# Patient Record
Sex: FEMALE | Race: WHITE | NOT HISPANIC OR LATINO | Employment: OTHER | URBAN - METROPOLITAN AREA
[De-identification: names, ages, dates, MRNs, and addresses within clinical notes are randomized per-mention and may not be internally consistent; named-entity substitution may affect disease eponyms.]

---

## 2018-07-17 ENCOUNTER — TELEPHONE (OUTPATIENT)
Dept: MEDICAL GROUP | Facility: PHYSICIAN GROUP | Age: 73
End: 2018-07-17

## 2018-07-17 RX ORDER — DIPHENOXYLATE HYDROCHLORIDE AND ATROPINE SULFATE 2.5; .025 MG/1; MG/1
1 TABLET ORAL 4 TIMES DAILY PRN
COMMUNITY
End: 2019-03-15 | Stop reason: SDUPTHER

## 2018-07-17 RX ORDER — LEVOTHYROXINE SODIUM 0.07 MG/1
75 TABLET ORAL
COMMUNITY
End: 2018-07-20

## 2018-07-17 NOTE — TELEPHONE ENCOUNTER
Future Appointments       Provider Department Center    7/18/2018 10:00 AM Sally Owen M.D. [facility]        NEW PATIENT VISIT PRE-VISIT PLANNING    1.  EpicCare Patient is checked in Patient Demographics? YES    2.  Immunizations were updated in Epic using WebIZ?: No WebIZ record       •  Web Iz Recommendations: PREVNAR (PCV13)  and TDAP    3.  Is this appointment scheduled as a Hospital Follow-Up? No    4.  Patient is due for the following Health Maintenance Topics:   Health Maintenance Due   Topic Date Due   • IMM DTaP/Tdap/Td Vaccine (1 - Tdap) 07/29/1963   • PAP SMEAR  07/29/1965   • MAMMOGRAM  07/29/1984   • COLONOSCOPY  07/29/1994   • BONE DENSITY  07/29/2009   • IMM PNEUMOCOCCAL  (1 of 2 - PCV13) 07/29/2009       5.  Reviewed/Updated the following with patient:   •   Preferred Pharmacy? YES       •   Preferred Lab? YES       •   Preferred Communication? YES       •   Allergies? YES       •   Medications? YES. Was Abstract Encounter opened and chart updated? YES       •   Social History? YES. Was Abstract Encounter opened and chart updated? YES       •   Family History (document living status of immediate family members and if + hx of cancer, diabetes, hypertension, hyperlipidemia, heart attack, stroke) YES. Was Abstract Encounter opened and chart updated? YES    6.  Updated Care Team?       •   DME Company (gait device, O2, CPAP, etc.) YES       •   Other Specialists (eye doctor, derm, GYN, cardiology, endo, etc): YES    7.  MDX printed for Provider? NO     8.  Patient was informed to arrive 15 min prior to their   scheduled appointment and bring in their medication bottles.

## 2018-07-18 ENCOUNTER — OFFICE VISIT (OUTPATIENT)
Dept: MEDICAL GROUP | Facility: PHYSICIAN GROUP | Age: 73
End: 2018-07-18
Payer: MEDICARE

## 2018-07-18 ENCOUNTER — HOSPITAL ENCOUNTER (OUTPATIENT)
Dept: LAB | Facility: MEDICAL CENTER | Age: 73
End: 2018-07-18
Attending: INTERNAL MEDICINE
Payer: MEDICARE

## 2018-07-18 VITALS
DIASTOLIC BLOOD PRESSURE: 84 MMHG | OXYGEN SATURATION: 94 % | WEIGHT: 139 LBS | TEMPERATURE: 97.5 F | SYSTOLIC BLOOD PRESSURE: 130 MMHG | HEIGHT: 65 IN | HEART RATE: 67 BPM | BODY MASS INDEX: 23.16 KG/M2

## 2018-07-18 DIAGNOSIS — Z12.31 ENCOUNTER FOR SCREENING MAMMOGRAM FOR BREAST CANCER: ICD-10-CM

## 2018-07-18 DIAGNOSIS — E78.5 DYSLIPIDEMIA: ICD-10-CM

## 2018-07-18 DIAGNOSIS — Z78.0 ASYMPTOMATIC MENOPAUSAL STATE: ICD-10-CM

## 2018-07-18 DIAGNOSIS — Z13.228 ENCOUNTER FOR SCREENING FOR METABOLIC DISORDER: ICD-10-CM

## 2018-07-18 DIAGNOSIS — E03.9 HYPOTHYROIDISM, UNSPECIFIED TYPE: ICD-10-CM

## 2018-07-18 DIAGNOSIS — G47.00 INSOMNIA, UNSPECIFIED TYPE: ICD-10-CM

## 2018-07-18 DIAGNOSIS — Z12.11 SCREENING FOR COLORECTAL CANCER: ICD-10-CM

## 2018-07-18 DIAGNOSIS — Z12.12 SCREENING FOR COLORECTAL CANCER: ICD-10-CM

## 2018-07-18 DIAGNOSIS — Z00.00 HEALTHCARE MAINTENANCE: ICD-10-CM

## 2018-07-18 DIAGNOSIS — Z23 NEED FOR VACCINATION: ICD-10-CM

## 2018-07-18 DIAGNOSIS — G25.0 BENIGN ESSENTIAL TREMOR: ICD-10-CM

## 2018-07-18 LAB
25(OH)D3 SERPL-MCNC: 19 NG/ML (ref 30–100)
ALBUMIN SERPL BCP-MCNC: 4.6 G/DL (ref 3.2–4.9)
ALBUMIN/GLOB SERPL: 1.6 G/DL
ALP SERPL-CCNC: 83 U/L (ref 30–99)
ALT SERPL-CCNC: 22 U/L (ref 2–50)
ANION GAP SERPL CALC-SCNC: 10 MMOL/L (ref 0–11.9)
AST SERPL-CCNC: 26 U/L (ref 12–45)
BASOPHILS # BLD AUTO: 0.9 % (ref 0–1.8)
BASOPHILS # BLD: 0.08 K/UL (ref 0–0.12)
BILIRUB SERPL-MCNC: 0.7 MG/DL (ref 0.1–1.5)
BUN SERPL-MCNC: 15 MG/DL (ref 8–22)
CALCIUM SERPL-MCNC: 9.6 MG/DL (ref 8.5–10.5)
CHLORIDE SERPL-SCNC: 101 MMOL/L (ref 96–112)
CHOLEST SERPL-MCNC: 296 MG/DL (ref 100–199)
CO2 SERPL-SCNC: 28 MMOL/L (ref 20–33)
CREAT SERPL-MCNC: 0.88 MG/DL (ref 0.5–1.4)
EOSINOPHIL # BLD AUTO: 0.13 K/UL (ref 0–0.51)
EOSINOPHIL NFR BLD: 1.5 % (ref 0–6.9)
ERYTHROCYTE [DISTWIDTH] IN BLOOD BY AUTOMATED COUNT: 49.1 FL (ref 35.9–50)
EST. AVERAGE GLUCOSE BLD GHB EST-MCNC: 114 MG/DL
GLOBULIN SER CALC-MCNC: 2.8 G/DL (ref 1.9–3.5)
GLUCOSE SERPL-MCNC: 87 MG/DL (ref 65–99)
HBA1C MFR BLD: 5.6 % (ref 0–5.6)
HCT VFR BLD AUTO: 41.2 % (ref 37–47)
HDLC SERPL-MCNC: 62 MG/DL
HGB BLD-MCNC: 13.7 G/DL (ref 12–16)
IMM GRANULOCYTES # BLD AUTO: 0.04 K/UL (ref 0–0.11)
IMM GRANULOCYTES NFR BLD AUTO: 0.5 % (ref 0–0.9)
LDLC SERPL CALC-MCNC: 198 MG/DL
LYMPHOCYTES # BLD AUTO: 2.18 K/UL (ref 1–4.8)
LYMPHOCYTES NFR BLD: 25.3 % (ref 22–41)
MCH RBC QN AUTO: 31.6 PG (ref 27–33)
MCHC RBC AUTO-ENTMCNC: 33.3 G/DL (ref 33.6–35)
MCV RBC AUTO: 94.9 FL (ref 81.4–97.8)
MONOCYTES # BLD AUTO: 0.64 K/UL (ref 0–0.85)
MONOCYTES NFR BLD AUTO: 7.4 % (ref 0–13.4)
NEUTROPHILS # BLD AUTO: 5.53 K/UL (ref 2–7.15)
NEUTROPHILS NFR BLD: 64.4 % (ref 44–72)
NRBC # BLD AUTO: 0 K/UL
NRBC BLD-RTO: 0 /100 WBC
PLATELET # BLD AUTO: 260 K/UL (ref 164–446)
PMV BLD AUTO: 11.9 FL (ref 9–12.9)
POTASSIUM SERPL-SCNC: 4.2 MMOL/L (ref 3.6–5.5)
PROT SERPL-MCNC: 7.4 G/DL (ref 6–8.2)
RBC # BLD AUTO: 4.34 M/UL (ref 4.2–5.4)
SODIUM SERPL-SCNC: 139 MMOL/L (ref 135–145)
T4 FREE SERPL-MCNC: 0.56 NG/DL (ref 0.53–1.43)
TRIGL SERPL-MCNC: 180 MG/DL (ref 0–149)
TSH SERPL DL<=0.005 MIU/L-ACNC: 47.48 UIU/ML (ref 0.38–5.33)
WBC # BLD AUTO: 8.6 K/UL (ref 4.8–10.8)

## 2018-07-18 PROCEDURE — 80053 COMPREHEN METABOLIC PANEL: CPT

## 2018-07-18 PROCEDURE — 36415 COLL VENOUS BLD VENIPUNCTURE: CPT

## 2018-07-18 PROCEDURE — 82306 VITAMIN D 25 HYDROXY: CPT | Mod: GA

## 2018-07-18 PROCEDURE — 85025 COMPLETE CBC W/AUTO DIFF WBC: CPT

## 2018-07-18 PROCEDURE — 84439 ASSAY OF FREE THYROXINE: CPT

## 2018-07-18 PROCEDURE — 99204 OFFICE O/P NEW MOD 45 MIN: CPT | Performed by: INTERNAL MEDICINE

## 2018-07-18 PROCEDURE — 80061 LIPID PANEL: CPT

## 2018-07-18 PROCEDURE — 84443 ASSAY THYROID STIM HORMONE: CPT

## 2018-07-18 PROCEDURE — 83036 HEMOGLOBIN GLYCOSYLATED A1C: CPT | Mod: GA

## 2018-07-18 RX ORDER — PRAVASTATIN SODIUM 20 MG
20 TABLET ORAL NIGHTLY
COMMUNITY
End: 2018-07-20

## 2018-07-18 RX ORDER — TRAZODONE HYDROCHLORIDE 50 MG/1
50 TABLET ORAL NIGHTLY
COMMUNITY
End: 2019-03-15 | Stop reason: SDUPTHER

## 2018-07-18 ASSESSMENT — PATIENT HEALTH QUESTIONNAIRE - PHQ9: CLINICAL INTERPRETATION OF PHQ2 SCORE: 0

## 2018-07-18 NOTE — ASSESSMENT & PLAN NOTE
Due for mammogram, colonoscopy, pneumonia and tetanus immunizations. Last DEXA scan done within the last 2 years as per the patient will get the records. Did not have colonoscopy in the past preferring fit test.

## 2018-07-18 NOTE — ASSESSMENT & PLAN NOTE
This is a chronic health problem that is well controlled with current medications and lifestyle measures. Was supposed to be on pravastatin 20 mg daily at bedtime, but not taking for the past 2 years.Last blood work at previous PCP within the last 2 yrs checked in May 2016.

## 2018-07-18 NOTE — ASSESSMENT & PLAN NOTE
This is a new problem which patient has been experiencing for the past 2 years.Happens intermittently. Refusing any medications offered.

## 2018-07-18 NOTE — ASSESSMENT & PLAN NOTE
This is a chronic health problem that is well controlled with current medications and lifestyle measures. Last blood work in 2016 . Currently on levothyroxine 75 µg every morning.

## 2018-07-18 NOTE — LETTER
[facility]  Sally Owen M.D.  [address]  [place] [address]  Fax: [phone]   Authorization for Release/Disclosure of   Protected Health Information   Name: POPPY PUENTE : 1945 SSN: [id number]   Address: [address] [place] [address]  [place] [address] Phone:    [phone] (home)    I authorize the entity listed below to release/disclose the PHI below to:   [facility]/Sally Owen M.D. and Sally Owen M.D.   Provider or Entity Name: Shane Celaya MD      Address   City, [facility], [facility]   Phone:      Fax:[phone]     Reason for request: continuity of care   Information to be released:    [  ] LAST COLONOSCOPY,  including any PATH REPORT and follow-up  [  ] LAST FIT/COLOGUARD RESULT [  ] LAST DEXA  [  ] LAST MAMMOGRAM  [  ] LAST PAP  [  ] LAST LABS [ ] RETINA EXAM REPORT  [  ] IMMUNIZATION RECORDS  [ XXX ] Release all info      [  ] Check here and initial the line next to each item to release ALL health information INCLUDING  _____ Care and treatment for drug and / or alcohol abuse  _____ HIV testing, infection status, or AIDS  _____ Genetic Testing    DATES OF SERVICE OR TIME PERIOD TO BE DISCLOSED: _____________  I understand and acknowledge that:  * This Authorization may be revoked at any time by you in writing, except if your health information has already been used or disclosed.  * Your health information that will be used or disclosed as a result of you signing this authorization could be re-disclosed by the recipient. If this occurs, your re-disclosed health information may no longer be protected by State or Federal laws.  * You may refuse to sign this Authorization. Your refusal will not affect your ability to obtain treatment.  * This Authorization becomes effective upon signing and will  on (date) __________.      If no date is indicated, this Authorization will  one (1) year from the signature date.    Name: Poppy Puente    Signature:   Date:        7/18/2018       PLEASE FAX REQUESTED RECORDS BACK TO: [phone]

## 2018-07-18 NOTE — ASSESSMENT & PLAN NOTE
This is a chronic health problem that is well controlled with current medications and lifestyle measures. Previously on trazodone 50 mg daily at bedtime currently only on when necessary.

## 2018-07-18 NOTE — LETTER
Request for Medical Records    Patient Name: Poppy Puente    : 1945      Dear Doctor: Janel Carlson O.D.    The above named patient receives primary care at the [facility] by Sally Owen M.D..  The patient informs us that you are her eye care Provider.    Please fax a copy of the most recent eye exam to [phone] or answer the  questions below and fax this sheet back to us at the above number.  Attached is a signed Release of Information.      Date of last eye exam: _____________    Retinal eye exam summary:        Please select the choice(s) that apply.    ____ No diabetic retinopathy    ____    Diabetic retinopathy present      Printed Name and Credentials: __________________________________    Signature of Eye Care Provider: _________________________________    We appreciate your assistance and collaboration in providing efficient patient care!    Kindest Regards,    [facility]  [address]  [facility] [address] [phone]

## 2018-07-18 NOTE — PROGRESS NOTES
CC:  Establish care with new PCP, hypothyroidism.    HISTORY OF THE PRESENT ILLNESS: Patient is a 72 y.o. female. This pleasant patient is here today to establish care with new PCP and discuss her medical conditions as mentioned in history of present illness below.    Health Maintenance: Completed      Dyslipidemia  This is a chronic health problem that is well controlled with current medications and lifestyle measures. Was supposed to be on pravastatin 20 mg daily at bedtime, but not taking for the past 2 years.Last blood work at previous PCP within the last 2 yrs checked in May 2016.     Insomnia  This is a chronic health problem that is well controlled with current medications and lifestyle measures. Previously on trazodone 50 mg daily at bedtime currently only on when necessary.    Hypothyroidism  This is a chronic health problem that is well controlled with current medications and lifestyle measures. Last blood work in 2016 . Currently on levothyroxine 75 µg every morning.    Healthcare maintenance  Due for mammogram, colonoscopy, pneumonia and tetanus immunizations. Last DEXA scan done within the last 2 years as per the patient will get the records. Did not have colonoscopy in the past preferring fit test.    Benign essential tremor  This is a new problem which patient has been experiencing for the past 2 years.Happens intermittently. Refusing any medications offered.    PHQ score 0, BMI within normal limits, no tobacco, no fall injuries    Allergies: Patient has no known allergies.    Current Outpatient Prescriptions Ordered in [facility]   Medication Sig Dispense Refill   • NON SPECIFIED Please give Tdap and PCV 13. 1 Each 0   • traZODone (DESYREL) 50 MG Tab Take 50 mg by mouth every evening.     • pravastatin (PRAVACHOL) 20 MG Tab Take 20 mg by mouth every evening.     • levothyroxine (SYNTHROID) 75 MCG Tab Take 75 mcg by mouth Every morning on an empty stomach.     • diphenoxylate-atropine (LOMOTIL) 2.5-0.025 MG  Tab Take 1 Tab by mouth 4 times a day as needed for Diarrhea.       No current Epic-ordered facility-administered medications on file.        History reviewed. No pertinent past medical history.    History reviewed. No pertinent surgical history.    Social History   Substance Use Topics   • Smoking status: Never Smoker   • Smokeless tobacco: Never Used   • Alcohol use Yes      Comment: occ        Social History     Social History Narrative   • No narrative on file       Family History   Problem Relation Age of Onset   • No Known Problems Mother    • No Known Problems Father    • Arthritis Sister    • No Known Problems Maternal Grandmother    • No Known Problems Maternal Grandfather    • No Known Problems Sister        ROS:     - Constitutional: Negative for fever, chills, unexpected weight change, and fatigue/generalized weakness.     - HEENT: Negative for headaches, vision changes, hearing changes, ear pain, ear discharge, rhinorrhea, sinus congestion, sore throat, and neck pain.      - Respiratory: Negative for cough, sputum production, chest congestion, dyspnea, wheezing, and crackles.      - Cardiovascular: Negative for chest pain, palpitations, orthopnea, and bilateral lower extremity edema.     - Gastrointestinal: Negative for heartburn, nausea, vomiting, abdominal pain, hematochezia, melena, diarrhea, constipation, and greasy/foul-smelling stools.     - Genitourinary: Negative for dysuria, polyuria, hematuria, pyuria, urinary urgency, and urinary incontinence.     - Musculoskeletal: Negative for myalgias, back pain, and joint pain.     - Skin: Negative for rash, itching, cyanotic skin color change.     - Neurological: Positive for occasional tremors which is intentional tremor on doing some fine work as per the history Negative for dizziness, tingling, focal sensory deficit, focal weakness and headaches.     - Endo/Heme/Allergies: Does not bruise/bleed easily.     - Psychiatric/Behavioral: Negative for  "depression, suicidal/homicidal ideation and memory loss.      Last lab work not seen in [facility], patient says it was done probably in 2016. We will get the records from previous PCP.    Exam: Blood pressure 130/84, pulse 67, temperature 36.4 °C (97.5 °F), height 1.651 m (5' 5\"), weight 63 kg (139 lb), SpO2 94 %. Body mass index is 23.13 kg/m².    General: Normal appearing. No distress.  HEENT: Normocephalic. Eyes conjunctiva clear lids without ptosis, pupils equal and reactive to light accommodation, ears normal shape and contour, canals are clear bilaterally, tympanic membranes are benign, nasal mucosa benign, oropharynx is without erythema, edema or exudates.   Neck: Supple without JVD or bruit. Thyroid is not enlarged.  Pulmonary: Clear to ausculation.  Normal effort. No rales, ronchi, or wheezing.  Cardiovascular: Regular rate and rhythm without murmur. Carotid and radial pulses are intact and equal bilaterally.  Abdomen: Soft, nontender, nondistended. Normal bowel sounds. Liver and spleen are not palpable  Neurologic: Grossly nonfocal. No tremors on examining with outstretched hands. Patient says it is not happening today and only happens intermittently.  Lymph: No cervical, supraclavicular or axillary lymph nodes are palpable  Skin: Warm and dry.  No obvious lesions.  Musculoskeletal: Normal gait. No extremity cyanosis, clubbing, or edema.  Psych: Normal mood and affect. Alert and oriented x3. Judgment and insight is normal.    Please note that this dictation was created using voice recognition software. I have made every reasonable attempt to correct obvious errors, but I expect that there are errors of grammar and possibly content that I did not discover before finalizing the note.      Assessment/Plan  1. Dyslipidemia  Stable, supposed to be on medication pravastatin 20 mg daily at bedtime which patient stopped by herself 2 years back as she didn't have refills. Did not have a checkup since 2016, we will do a " lipid panel and CMP and restart this medication.  - COMP METABOLIC PANEL; Future  - LIPID PROFILE; Future    2. Hypothyroidism, unspecified type  Stable, currently on levothyroxine 75 mg every morning. No lab work done after 2016 as per the patient, I do not have the records. Requesting for refills. Given instructions that I will have to see the lab work results before refilling the medication.  - TSH WITH REFLEX TO FT4; Future    3. Insomnia, unspecified type  Well controlled, continue current trazodone 50 mg daily at bedtime when necessary.    4. Screening for colorectal cancer  - OCCULT BLOOD FECES IMMUNOASSAY (FIT); Future    5. Asymptomatic menopausal state  Never got her vitamin D checked any time, we will do a vitamin D checked and repleted if needed.  - VITAMIN D,25 HYDROXY; Future    6. Encounter for screening mammogram for breast cancer  - MA-SCREEN MAMMO W/CAD-BILAT; Future    7. Need for vaccination  - NON SPECIFIED; Please give Tdap and PCV 13.  Dispense: 1 Each; Refill: 0    8. Encounter for screening for metabolic disorder  Will do a baseline CBC, A1c given her age.  - CBC WITH DIFFERENTIAL; Future  - HEMOGLOBIN A1C; Future    10. Benign essential tremor  Patient does not have the tremor today on my exam, it happens only intermittently as per history. Not ready to start on any medication at this time given instructions to inform if it aggravates which patient understood and agreed.

## 2018-07-19 ENCOUNTER — TELEPHONE (OUTPATIENT)
Dept: MEDICAL GROUP | Facility: PHYSICIAN GROUP | Age: 73
End: 2018-07-19

## 2018-07-19 NOTE — TELEPHONE ENCOUNTER
----- Message from Sally Owen M.D. sent at 7/18/2018  8:52 PM PDT -----  Your CBC is normal.  Sally Owen M.D.

## 2018-07-20 ENCOUNTER — TELEPHONE (OUTPATIENT)
Dept: MEDICAL GROUP | Facility: PHYSICIAN GROUP | Age: 73
End: 2018-07-20

## 2018-07-20 DIAGNOSIS — E78.5 DYSLIPIDEMIA: ICD-10-CM

## 2018-07-20 DIAGNOSIS — E55.9 VITAMIN D DEFICIENCY: ICD-10-CM

## 2018-07-20 DIAGNOSIS — E03.9 HYPOTHYROIDISM, UNSPECIFIED TYPE: ICD-10-CM

## 2018-07-20 RX ORDER — ATORVASTATIN CALCIUM 20 MG/1
20 TABLET, FILM COATED ORAL DAILY
Qty: 90 TAB | Refills: 1 | Status: SHIPPED | OUTPATIENT
Start: 2018-07-20 | End: 2018-07-20

## 2018-07-20 RX ORDER — PRAVASTATIN SODIUM 20 MG
40 TABLET ORAL DAILY
Qty: 90 TAB | Refills: 1 | Status: SHIPPED | OUTPATIENT
Start: 2018-07-20 | End: 2019-09-24

## 2018-07-20 RX ORDER — LEVOTHYROXINE SODIUM 0.1 MG/1
100 TABLET ORAL
Qty: 30 TAB | Refills: 1 | Status: SHIPPED | OUTPATIENT
Start: 2018-07-20 | End: 2018-08-31

## 2018-07-20 RX ORDER — ERGOCALCIFEROL 1.25 MG/1
50000 CAPSULE ORAL
Qty: 12 CAP | Refills: 3 | Status: SHIPPED | OUTPATIENT
Start: 2018-07-20 | End: 2019-09-24

## 2018-07-20 NOTE — TELEPHONE ENCOUNTER
Patient notified via Phone. Patient was prescribed atorvastatin but stated she has side effects when taking. Patient prefers pravastatin which does not have side effects for her.

## 2018-07-20 NOTE — TELEPHONE ENCOUNTER
----- Message from Sally Owen M.D. sent at 7/20/2018 12:20 AM PDT -----  Your Cholesterol levels are abnormally high , I have changed the medication to atorvastatin 20 mg sent to your pharmacy. Discontinued the previous medication Pravastatin.  Your Vitamin D is low , sent high dose to your pharmacy.  Your thyroid levels are severely elevated at 47 . I have increased the dose of your Levothyroxine to 100 mcg sent to your pharmacy , will need a repeat TSH level in 6 weeks at 08/28/2018 , after starting the new dose so that I can adjust the dose further if needed.  All of your other labs were normal. You may see some that are highlighted, however these have no clinical significance.  Sally Owen M.D.

## 2018-07-20 NOTE — TELEPHONE ENCOUNTER
I have increased the dose of pravastatin to 40 mg daily given the abnormal lab results. Thank you  Sally Owen M.D.

## 2018-07-24 ENCOUNTER — HOSPITAL ENCOUNTER (OUTPATIENT)
Facility: MEDICAL CENTER | Age: 73
End: 2018-07-24
Attending: INTERNAL MEDICINE
Payer: MEDICARE

## 2018-07-24 PROCEDURE — 82274 ASSAY TEST FOR BLOOD FECAL: CPT

## 2018-07-29 DIAGNOSIS — Z12.11 SCREENING FOR COLORECTAL CANCER: ICD-10-CM

## 2018-07-29 DIAGNOSIS — Z12.12 SCREENING FOR COLORECTAL CANCER: ICD-10-CM

## 2018-07-30 LAB — HEMOCCULT STL QL IA: NEGATIVE

## 2018-08-09 ENCOUNTER — HOSPITAL ENCOUNTER (OUTPATIENT)
Dept: RADIOLOGY | Facility: MEDICAL CENTER | Age: 73
End: 2018-08-09
Attending: INTERNAL MEDICINE
Payer: MEDICARE

## 2018-08-09 DIAGNOSIS — Z12.31 ENCOUNTER FOR SCREENING MAMMOGRAM FOR BREAST CANCER: ICD-10-CM

## 2018-08-09 PROCEDURE — 77067 SCR MAMMO BI INCL CAD: CPT

## 2018-08-16 ENCOUNTER — HOSPITAL ENCOUNTER (OUTPATIENT)
Dept: RADIOLOGY | Facility: MEDICAL CENTER | Age: 73
End: 2018-08-16

## 2018-08-23 ENCOUNTER — TELEPHONE (OUTPATIENT)
Dept: MEDICAL GROUP | Facility: PHYSICIAN GROUP | Age: 73
End: 2018-08-23

## 2018-08-23 NOTE — PROGRESS NOTES
Your DEXA scan for bone density is normal, continue over-the-counter calcium and vitamin D supplements.  Sally Owen M.D.

## 2018-08-23 NOTE — TELEPHONE ENCOUNTER
----- Message from Sally Owen M.D. sent at 8/22/2018 10:56 PM PDT -----  - PLEASE IGNORE THE PREVIOUS DEXA RESULT MESSAGE WHICH WAS SENT IN ERROR.    I reviewed the results of your mammogram. Radiologist reports normal findings. I recommend recheck in one year.Thanks.  Sally Owen M.D.

## 2018-08-30 ENCOUNTER — HOSPITAL ENCOUNTER (OUTPATIENT)
Dept: LAB | Facility: MEDICAL CENTER | Age: 73
End: 2018-08-30
Attending: INTERNAL MEDICINE
Payer: MEDICARE

## 2018-08-30 DIAGNOSIS — E03.9 HYPOTHYROIDISM, UNSPECIFIED TYPE: ICD-10-CM

## 2018-08-30 LAB — TSH SERPL DL<=0.005 MIU/L-ACNC: 0.08 UIU/ML (ref 0.38–5.33)

## 2018-08-30 PROCEDURE — 36415 COLL VENOUS BLD VENIPUNCTURE: CPT

## 2018-08-30 PROCEDURE — 84443 ASSAY THYROID STIM HORMONE: CPT

## 2018-08-31 DIAGNOSIS — E03.9 HYPOTHYROIDISM, UNSPECIFIED TYPE: ICD-10-CM

## 2018-08-31 RX ORDER — LEVOTHYROXINE SODIUM 0.05 MG/1
50 TABLET ORAL
Qty: 90 TAB | Refills: 1 | Status: SHIPPED | OUTPATIENT
Start: 2018-08-31 | End: 2019-02-07 | Stop reason: SDUPTHER

## 2019-02-07 DIAGNOSIS — E03.9 HYPOTHYROIDISM, UNSPECIFIED TYPE: ICD-10-CM

## 2019-02-08 DIAGNOSIS — E03.9 HYPOTHYROIDISM, UNSPECIFIED TYPE: ICD-10-CM

## 2019-02-08 RX ORDER — LEVOTHYROXINE SODIUM 0.05 MG/1
TABLET ORAL
Qty: 30 TAB | Refills: 0 | Status: SHIPPED | OUTPATIENT
Start: 2019-02-08 | End: 2019-03-15 | Stop reason: SDUPTHER

## 2019-02-08 NOTE — TELEPHONE ENCOUNTER
Refill done for 30 days. Placed repeat thyroid test which needs to be done.  Please make a follow up appointment for further discussions.  Sally Owen M.D.

## 2019-03-08 ENCOUNTER — HOSPITAL ENCOUNTER (OUTPATIENT)
Dept: LAB | Facility: MEDICAL CENTER | Age: 74
End: 2019-03-08
Attending: INTERNAL MEDICINE
Payer: MEDICARE

## 2019-03-08 DIAGNOSIS — E03.9 HYPOTHYROIDISM, UNSPECIFIED TYPE: ICD-10-CM

## 2019-03-08 LAB — TSH SERPL DL<=0.005 MIU/L-ACNC: 5.02 UIU/ML (ref 0.38–5.33)

## 2019-03-08 PROCEDURE — 84443 ASSAY THYROID STIM HORMONE: CPT

## 2019-03-08 PROCEDURE — 36415 COLL VENOUS BLD VENIPUNCTURE: CPT

## 2019-03-15 ENCOUNTER — OFFICE VISIT (OUTPATIENT)
Dept: MEDICAL GROUP | Facility: PHYSICIAN GROUP | Age: 74
End: 2019-03-15
Payer: MEDICARE

## 2019-03-15 VITALS
HEART RATE: 77 BPM | TEMPERATURE: 97.8 F | SYSTOLIC BLOOD PRESSURE: 108 MMHG | BODY MASS INDEX: 24.56 KG/M2 | OXYGEN SATURATION: 96 % | DIASTOLIC BLOOD PRESSURE: 70 MMHG | HEIGHT: 65 IN | WEIGHT: 147.4 LBS

## 2019-03-15 DIAGNOSIS — K52.9 CHRONIC DIARRHEA: ICD-10-CM

## 2019-03-15 DIAGNOSIS — E78.5 DYSLIPIDEMIA: ICD-10-CM

## 2019-03-15 DIAGNOSIS — Z12.12 SCREENING FOR COLORECTAL CANCER: ICD-10-CM

## 2019-03-15 DIAGNOSIS — E03.9 HYPOTHYROIDISM, UNSPECIFIED TYPE: ICD-10-CM

## 2019-03-15 DIAGNOSIS — G47.00 INSOMNIA, UNSPECIFIED TYPE: ICD-10-CM

## 2019-03-15 DIAGNOSIS — Z12.11 SCREENING FOR COLORECTAL CANCER: ICD-10-CM

## 2019-03-15 PROCEDURE — 99214 OFFICE O/P EST MOD 30 MIN: CPT | Performed by: INTERNAL MEDICINE

## 2019-03-15 RX ORDER — DIPHENOXYLATE HYDROCHLORIDE AND ATROPINE SULFATE 2.5; .025 MG/1; MG/1
1 TABLET ORAL 4 TIMES DAILY PRN
Qty: 20 TAB | Refills: 1 | Status: SHIPPED
Start: 2019-03-15 | End: 2019-04-14

## 2019-03-15 RX ORDER — TRAZODONE HYDROCHLORIDE 50 MG/1
50 TABLET ORAL
Qty: 90 TAB | Refills: 1 | Status: SHIPPED | OUTPATIENT
Start: 2019-03-15 | End: 2019-09-24

## 2019-03-15 RX ORDER — LEVOTHYROXINE SODIUM 0.05 MG/1
50 TABLET ORAL EVERY MORNING
Qty: 90 TAB | Refills: 1 | Status: SHIPPED | OUTPATIENT
Start: 2019-03-15 | End: 2019-09-28 | Stop reason: SDUPTHER

## 2019-03-15 ASSESSMENT — PATIENT HEALTH QUESTIONNAIRE - PHQ9: CLINICAL INTERPRETATION OF PHQ2 SCORE: 0

## 2019-03-15 NOTE — ASSESSMENT & PLAN NOTE
This is a chronic health problem that is well controlled with current medications and lifestyle measures. Stopped taking her Pravastatin in 07/2018 as pt doesn't want to take statins at this age.

## 2019-03-15 NOTE — PROGRESS NOTES
CC: Follow-up visit, diarrhea.    HISTORY OF PRESENT ILLNESS: Patient is a 73 y.o. female established patient who presents today to discuss her medical conditions as mentioned in HPI below.    Health Maintenance: Due for all the healthcare maintenance which she refuses only accepted for a fit test.    Dyslipidemia  This is a chronic health problem that is well controlled with current medications and lifestyle measures. Stopped taking her Pravastatin in 07/2018 as pt doesn't want to take statins at this age.    Hypothyroidism  This is a chronic health problem that is well controlled with current medications and lifestyle measures. Last TSH was normal in 03/2019. On LT 50 mcg QAM    Chronic diarrhea  This is a chronic health problem that is well controlled with current medications and lifestyle measures. Used to get Lomotil with her previous provider at [place]. Requesting for the same as she needs once a week.      PHQ score 0, BMI within normal limits, no tobacco, no fall injuries.    Patient Active Problem List    Diagnosis Date Noted   • Chronic diarrhea 03/15/2019   • Dyslipidemia 07/18/2018   • Hypothyroidism 07/18/2018   • Insomnia 07/18/2018   • Healthcare maintenance 07/18/2018   • Benign essential tremor 07/18/2018      Allergies:Patient has no known allergies.    Current Outpatient Prescriptions   Medication Sig Dispense Refill   • traZODone (DESYREL) 50 MG Tab Take 1 Tab by mouth at bedtime as needed for Sleep. 90 Tab 1   • levothyroxine (SYNTHROID) 50 MCG Tab Take 1 Tab by mouth every morning. ON A EMPTY STOMACH 90 Tab 1   • diphenoxylate-atropine (LOMOTIL) 2.5-0.025 MG Tab Take 1 Tab by mouth 4 times a day as needed for Diarrhea for up to 30 days. 20 Tab 1   • vitamin D, Ergocalciferol, (DRISDOL) 16632 units Cap capsule Take 1 Cap by mouth every 7 days. 12 Cap 3   • pravastatin (PRAVACHOL) 20 MG Tab Take 2 Tabs by mouth every day. (Patient not taking: Reported on 3/15/2019) 90 Tab 1   • NON SPECIFIED  Please give Tdap and PCV 13. 1 Each 0     No current facility-administered medications for this visit.        Social History   Substance Use Topics   • Smoking status: Never Smoker   • Smokeless tobacco: Never Used   • Alcohol use Yes      Comment: occ      Social History     Social History Narrative   • No narrative on file       Family History   Problem Relation Age of Onset   • No Known Problems Mother    • No Known Problems Father    • Arthritis Sister    • No Known Problems Maternal Grandmother    • No Known Problems Maternal Grandfather    • No Known Problems Sister         ROS:     - Constitutional:  Negative for fever, chills, unexpected weight change, and fatigue/generalized weakness.    - HEENT:  Negative for headaches, vision changes, hearing changes, ear pain, ear discharge, rhinorrhea, sinus congestion, sore throat, and neck pain.      - Respiratory: Negative for cough, sputum production, chest congestion, dyspnea, wheezing, and crackles.      - Cardiovascular: Negative for chest pain, palpitations, orthopnea, and bilateral lower extremity edema.     - Gastrointestinal: As mentioned in HPI above negative for heartburn, nausea, vomiting, abdominal pain, hematochezia, melena, diarrhea, constipation, and greasy/foul-smelling stools.     - Genitourinary: Negative for dysuria, polyuria, hematuria, pyuria, urinary urgency, and urinary incontinence.     - Musculoskeletal: Negative for myalgias, back pain, and joint pain.     - Skin: Negative for rash, itching, cyanotic skin color change.     - Neurological: Negative for dizziness, tingling, tremors, focal sensory deficit, focal weakness and headaches.     - Endo/Heme/Allergies: Does not bruise/bleed easily.     - Psychiatric/Behavioral: Negative for depression, suicidal/homicidal ideation and memory loss.        Last lab work in July 2018 reviewed and discussed with patient.    Exam:    Blood pressure 108/70, pulse 77, temperature 36.6 °C (97.8 °F),  "temperature source Temporal, height 1.651 m (5' 5\"), weight 66.9 kg (147 lb 6.4 oz), SpO2 96 %. Body mass index is 24.53 kg/m².    General:  Well nourished, well developed female in NAD  Head is grossly normal.  Neck: Supple without JVD or bruit. Thyroid is not enlarged.  Pulmonary: Clear to ausculation and percussion.  Normal effort. No rales, ronchi, or wheezing.  Cardiovascular: Regular rate and rhythm without murmur. Carotid and radial pulses are intact and equal bilaterally.  Extremities: no clubbing, cyanosis, or edema.  Abdominal exam : Soft, nontender, no rigidity or guarding.    Please note that this dictation was created using voice recognition software. I have made every reasonable attempt to correct obvious errors, but I expect that there are errors of grammar and possibly content that I did not discover before finalizing the note.    Assessment/Plan:  1. Hypothyroidism, unspecified type  Well-controlled, continue current levothyroxine 50 mg nightly.  Recent TSH levels were abnormal for which the dosage was adjusted and last workup in March 2019 was within normal limits.  - levothyroxine (SYNTHROID) 50 MCG Tab; Take 1 Tab by mouth every morning. ON A EMPTY STOMACH  Dispense: 90 Tab; Refill: 1    2. Dyslipidemia  Last lipid panel in July 2018 abnormal with elevated triglycerides and LDL.  Patient self managed and stopped the pravastatin as his insurance is not covering anymore and will need an alternative statin.  This was discussed but she is refusing to take any at this time, all the risks understood.    3. Insomnia, unspecified type  Well-controlled, continue current trazodone 50 mg daily.  - traZODone (DESYREL) 50 MG Tab; Take 1 Tab by mouth at bedtime as needed for Sleep.  Dispense: 90 Tab; Refill: 1    5. Chronic diarrhea  Ongoing problem, patient requesting only for Lomotil refusing all the alternatives offered to her.  I have provided her the handout of adverse reactions of atropine in this age " group which he understood all the risks but insisting only this medication.  She takes only around 1 week will give short refills.  - diphenoxylate-atropine (LOMOTIL) 2.5-0.025 MG Tab; Take 1 Tab by mouth 4 times a day as needed for Diarrhea for up to 30 days.  Dispense: 20 Tab; Refill: 1    6. Screening for colorectal cancer  - OCCULT BLOOD FECES IMMUNOASSAY (FIT); Future

## 2019-03-15 NOTE — ASSESSMENT & PLAN NOTE
This is a chronic health problem that is well controlled with current medications and lifestyle measures. Used to get Lomotil with her previous provider at [place]. Requesting for the same as she needs once a week.

## 2019-03-15 NOTE — ASSESSMENT & PLAN NOTE
This is a chronic health problem that is well controlled with current medications and lifestyle measures. Last TSH was normal in 03/2019. On LT 50 mcg QAM

## 2019-03-20 ENCOUNTER — HOSPITAL ENCOUNTER (OUTPATIENT)
Facility: MEDICAL CENTER | Age: 74
End: 2019-03-20
Attending: INTERNAL MEDICINE
Payer: MEDICARE

## 2019-03-20 PROCEDURE — 82274 ASSAY TEST FOR BLOOD FECAL: CPT

## 2019-03-22 DIAGNOSIS — Z12.12 SCREENING FOR COLORECTAL CANCER: ICD-10-CM

## 2019-03-22 DIAGNOSIS — Z12.11 SCREENING FOR COLORECTAL CANCER: ICD-10-CM

## 2019-03-23 LAB — HEMOCCULT STL QL IA: NEGATIVE

## 2019-09-24 ENCOUNTER — HOSPITAL ENCOUNTER (OUTPATIENT)
Dept: LAB | Facility: MEDICAL CENTER | Age: 74
End: 2019-09-24
Attending: INTERNAL MEDICINE
Payer: MEDICARE

## 2019-09-24 ENCOUNTER — OFFICE VISIT (OUTPATIENT)
Dept: MEDICAL GROUP | Facility: PHYSICIAN GROUP | Age: 74
End: 2019-09-24
Payer: MEDICARE

## 2019-09-24 VITALS
OXYGEN SATURATION: 97 % | HEART RATE: 60 BPM | HEIGHT: 65 IN | TEMPERATURE: 97.1 F | BODY MASS INDEX: 24.32 KG/M2 | DIASTOLIC BLOOD PRESSURE: 74 MMHG | WEIGHT: 146 LBS | SYSTOLIC BLOOD PRESSURE: 112 MMHG

## 2019-09-24 DIAGNOSIS — E03.9 HYPOTHYROIDISM, UNSPECIFIED TYPE: ICD-10-CM

## 2019-09-24 DIAGNOSIS — Z23 NEED FOR VACCINATION: ICD-10-CM

## 2019-09-24 DIAGNOSIS — E78.5 DYSLIPIDEMIA: ICD-10-CM

## 2019-09-24 DIAGNOSIS — E55.9 VITAMIN D DEFICIENCY: ICD-10-CM

## 2019-09-24 DIAGNOSIS — G47.00 INSOMNIA, UNSPECIFIED TYPE: ICD-10-CM

## 2019-09-24 DIAGNOSIS — G25.0 BENIGN ESSENTIAL TREMOR: ICD-10-CM

## 2019-09-24 DIAGNOSIS — Z12.11 SCREENING FOR COLORECTAL CANCER: ICD-10-CM

## 2019-09-24 DIAGNOSIS — K52.9 CHRONIC DIARRHEA: ICD-10-CM

## 2019-09-24 DIAGNOSIS — Z12.12 SCREENING FOR COLORECTAL CANCER: ICD-10-CM

## 2019-09-24 LAB
25(OH)D3 SERPL-MCNC: 29 NG/ML (ref 30–100)
ALBUMIN SERPL BCP-MCNC: 4.5 G/DL (ref 3.2–4.9)
ALBUMIN/GLOB SERPL: 2 G/DL
ALP SERPL-CCNC: 66 U/L (ref 30–99)
ALT SERPL-CCNC: 18 U/L (ref 2–50)
ANION GAP SERPL CALC-SCNC: 11 MMOL/L (ref 0–11.9)
AST SERPL-CCNC: 24 U/L (ref 12–45)
BILIRUB SERPL-MCNC: 0.6 MG/DL (ref 0.1–1.5)
BUN SERPL-MCNC: 11 MG/DL (ref 8–22)
CALCIUM SERPL-MCNC: 9.8 MG/DL (ref 8.5–10.5)
CHLORIDE SERPL-SCNC: 103 MMOL/L (ref 96–112)
CHOLEST SERPL-MCNC: 280 MG/DL (ref 100–199)
CO2 SERPL-SCNC: 27 MMOL/L (ref 20–33)
CREAT SERPL-MCNC: 0.74 MG/DL (ref 0.5–1.4)
GLOBULIN SER CALC-MCNC: 2.3 G/DL (ref 1.9–3.5)
GLUCOSE SERPL-MCNC: 86 MG/DL (ref 65–99)
HDLC SERPL-MCNC: 57 MG/DL
LDLC SERPL CALC-MCNC: 180 MG/DL
POTASSIUM SERPL-SCNC: 4 MMOL/L (ref 3.6–5.5)
PROT SERPL-MCNC: 6.8 G/DL (ref 6–8.2)
SODIUM SERPL-SCNC: 141 MMOL/L (ref 135–145)
TRIGL SERPL-MCNC: 214 MG/DL (ref 0–149)
TSH SERPL DL<=0.005 MIU/L-ACNC: 2.84 UIU/ML (ref 0.38–5.33)

## 2019-09-24 PROCEDURE — 36415 COLL VENOUS BLD VENIPUNCTURE: CPT

## 2019-09-24 PROCEDURE — G0439 PPPS, SUBSEQ VISIT: HCPCS | Performed by: INTERNAL MEDICINE

## 2019-09-24 PROCEDURE — 90472 IMMUNIZATION ADMIN EACH ADD: CPT | Performed by: INTERNAL MEDICINE

## 2019-09-24 PROCEDURE — G0009 ADMIN PNEUMOCOCCAL VACCINE: HCPCS | Performed by: INTERNAL MEDICINE

## 2019-09-24 PROCEDURE — 82306 VITAMIN D 25 HYDROXY: CPT

## 2019-09-24 PROCEDURE — 84443 ASSAY THYROID STIM HORMONE: CPT

## 2019-09-24 PROCEDURE — 80053 COMPREHEN METABOLIC PANEL: CPT

## 2019-09-24 PROCEDURE — 80061 LIPID PANEL: CPT

## 2019-09-24 PROCEDURE — 90670 PCV13 VACCINE IM: CPT | Performed by: INTERNAL MEDICINE

## 2019-09-24 PROCEDURE — 90715 TDAP VACCINE 7 YRS/> IM: CPT | Performed by: INTERNAL MEDICINE

## 2019-09-24 RX ORDER — PRAVASTATIN SODIUM 40 MG
40 TABLET ORAL DAILY
Qty: 100 TAB | Refills: 1 | Status: SHIPPED | OUTPATIENT
Start: 2019-09-24 | End: 2019-09-25

## 2019-09-24 ASSESSMENT — ACTIVITIES OF DAILY LIVING (ADL): BATHING_REQUIRES_ASSISTANCE: 0

## 2019-09-24 ASSESSMENT — PATIENT HEALTH QUESTIONNAIRE - PHQ9
CLINICAL INTERPRETATION OF PHQ2 SCORE: 0
SUM OF ALL RESPONSES TO PHQ QUESTIONS 1-9: 0
5. POOR APPETITE OR OVEREATING: 0 - NOT AT ALL

## 2019-09-24 ASSESSMENT — ENCOUNTER SYMPTOMS: GENERAL WELL-BEING: EXCELLENT

## 2019-09-24 NOTE — PROGRESS NOTES
Chief Complaint   Patient presents with   • Annual Wellness Visit         HPI:  Poppy is a 74 y.o. here for Medicare Annual Wellness Visit        Patient Active Problem List    Diagnosis Date Noted   • Chronic diarrhea 03/15/2019   • Dyslipidemia 07/18/2018   • Hypothyroidism 07/18/2018   • Insomnia 07/18/2018   • Healthcare maintenance 07/18/2018   • Benign essential tremor 07/18/2018       Current Outpatient Medications   Medication Sig Dispense Refill   • pravastatin (PRAVACHOL) 40 MG tablet Take 1 Tab by mouth every day. 100 Tab 1   • levothyroxine (SYNTHROID) 50 MCG Tab Take 1 Tab by mouth every morning. ON A EMPTY STOMACH 90 Tab 1     No current facility-administered medications for this visit.         Patient is taking medications as noted in medication list.  Current supplements as per medication list.     Allergies: Patient has no known allergies.    Current social contact/activities: Visit with family and friends      Is patient current with immunizations? No, due for FLU, PREVNAR (PCV13) , TDAP and SHINGRIX (Shingles). Patient is interested in receiving PREVNAR (PCV13)  and TDAP today.    She  reports that she has never smoked. She has never used smokeless tobacco. She reports that she drinks alcohol. She reports that she does not use drugs.  Counseling given: Not Answered        DPA/Advanced directive: Patient does not have an Advanced Directive.  A packet and workshop information was given on Advanced Directives.    ROS:    Gait: Uses no assistive device   Ostomy: No   Other tubes: No   Amputations: No   Chronic oxygen use No   Last eye exam 1 month Ago    Wears hearing aids: No   : Denies any urinary leakage during the last 6 months    Depression Screening    Little interest or pleasure in doing things?  0 - not at all  Feeling down, depressed, or hopeless? 0 - not at all  Patient Health Questionnaire Score: 0    If depressive symptoms identified deferred to follow up visit unless specifically  addressed in assessment and plan.    Interpretation of PHQ-9 Total Score   Score Severity   1-4 No Depression   5-9 Mild Depression   10-14 Moderate Depression   15-19 Moderately Severe Depression   20-27 Severe Depression    Screening for Cognitive Impairment    Three Minute Recall (village, kitchen, baby)  2/3 Village kitchen baby   Rocky clock face with all 12 numbers and set the hands to show 10 past 10.  Yes 10:10 4/5  If cognitive concerns identified, deferred for follow up unless specifically addressed in assessment and plan.    Fall Risk Assessment    Has the patient had two or more falls in the last year or any fall with injury in the last year?  No  If fall risk identified, deferred for follow up unless specifically addressed in assessment and plan.    Safety Assessment    Throw rugs on floor.  No  Handrails on all stairs.  Yes  Good lighting in all hallways.  Yes  Difficulty hearing.  No  Patient counseled about all safety risks that were identified.    Functional Assessment ADLs    Are there any barriers preventing you from cooking for yourself or meeting nutritional needs?  No.    Are there any barriers preventing you from driving safely or obtaining transportation?  No.    Are there any barriers preventing you from using a telephone or calling for help?  No.    Are there any barriers preventing you from shopping?  No.    Are there any barriers preventing you from taking care of your own finances?  No.    Are there any barriers preventing you from managing your medications?  No.    Are there any barriers preventing you from showering, bathing or dressing yourself?  No.    Are you currently engaging in any exercise or physical activity?  Yes.  [employer] member   What is your perception of your health?  Excellent.    Health Maintenance Summary                Annual Wellness Visit Overdue 1945     IMM DTaP/Tdap/Td Vaccine Overdue 7/29/1963     PAP SMEAR Overdue 7/29/1965     COLOGUARD STOOL DNA Overdue  "7/29/1994     IMM ZOSTER VACCINES Overdue 7/29/1994     IMM PNEUMOCOCCAL VACCINE: 65+ Years Overdue 7/29/2009     MAMMOGRAM Overdue 8/9/2019      Done 8/9/2018 MA-MAMMO SCREENING BILAT W/TOMOSYNTHESIS W/CAD     Patient has more history with this topic...    IMM INFLUENZA Overdue 9/1/2019     BONE DENSITY Next Due 11/10/2020      Done 11/10/2015 DS-BONE DENSITY STUDY (DEXA)          Patient Care Team:  Sally Owen M.D. as PCP - General (Internal Medicine)  Janel Carlson O.D. as Consulting Physician (Optometry)  Isabella Brannon MD  as Consulting Physician (Family Medicine)    Social History     Tobacco Use   • Smoking status: Never Smoker   • Smokeless tobacco: Never Used   Substance Use Topics   • Alcohol use: Yes     Comment: occ    • Drug use: No     Family History   Problem Relation Age of Onset   • No Known Problems Mother    • No Known Problems Father    • Arthritis Sister    • No Known Problems Maternal Grandmother    • No Known Problems Maternal Grandfather    • No Known Problems Sister      She  has no past medical history on file.   History reviewed. No pertinent surgical history.      PHYSICAL EXAM  VITAL SIGNS:   /74 (BP Location: Right arm, Patient Position: Sitting, BP Cuff Size: Large adult)   Pulse 60   Temp 36.2 °C (97.1 °F)   Ht 1.651 m (5' 5\")   Wt 66.2 kg (146 lb)   SpO2 97%   BMI 24.30 kg/m²   Constitutional: Well developed, Well nourished, No acute distress, Non-toxic appearance.    HENT: Normocephalic, Atraumatic, Bilateral external ears normal, Oropharynx moist, No oral exudates, Nose normal.    Eyes: PERRLA, EOMI, Conjunctiva normal, No discharge.    Neck: Normal range of motion, No tenderness, Supple, No stridor.    Lymphatic: No lymphadenopathy noted.    Cardiovascular: Regular rate and rhythm,  No murmurs, No rubs, No gallops.    Thorax & Lungs: Normal breath sounds, No respiratory distress, No wheezing, No chest tenderness.    Abdomen: Bowel sounds normal, Soft, No " tenderness, No masses, No pulsatile masses.    Skin: Warm, Dry, No erythema, No rash.    Back: No tenderness, No CVA tenderness.   Extremities: Intact distal pulses, No edema, No tenderness, No cyanosis, No clubbing.    Musculoskeletal: Good range of motion in all major joints. No tenderness to palpation or major deformities noted.    Neurologic: Alert & oriented x 3, Normal motor function, Normal sensory function, No focal deficits noted.    Psychiatric: Affect normal, Judgment normal, Mood normal.   Hearing fair.    Dentition fair  Alert, oriented in no acute distress.  Eye contact is good, speech goal directed, affect calm      Assessment and Plan. The following treatment and monitoring plan is recommended:      1. Dyslipidemia  Uncontrolled, patient has stopped taking pravastatin for more than a year as she thinks that pharmacy did not supply her and insurance not covering it.  Discussed at length that she will need to be compliant with the medication, I have resent the medication to pharmacy.  Will recheck her lipid panel and LFTs before adjusting further on this medication.  - Comp Metabolic Panel; Future  - Lipid Profile; Future  - pravastatin (PRAVACHOL) 40 MG tablet; Take 1 Tab by mouth every day.  Dispense: 100 Tab; Refill: 1  - Initial Annual Wellness Visit - Includes PPPS ()    2. Hypothyroidism, unspecified type  Well-controlled, continue current levothyroxine 50 mcg nightly.  Last TSH in 2018, to continue current levothyroxine.  Will recheck and adjust the dose if needed.  - TSH WITH REFLEX TO FT4; Future  - Initial Annual Wellness Visit - Includes PPPS ()    3. Insomnia, unspecified type  Well-controlled on current diet and lifestyle modification.  Patient was supposed to be on trazodone which is slowly weaned down and stop it.  Not requiring any more.  - Initial Annual Wellness Visit - Includes PPPS ()    4. Benign essential tremor  Well-controlled, continue current lifestyle  modification.  - Initial Annual Wellness Visit - Includes PPPS ()    5. Chronic diarrhea  Well-controlled, continue current lifestyle modification.  - Initial Annual Wellness Visit - Includes PPPS ()    6. Screening for colorectal cancer  - Cologuard Colon Cancer Screening (FIT DNA)  - Initial Annual Wellness Visit - Includes PPPS ()    7. Vitamin D deficiency  Patient currently on over-the-counter vitamin D 2000 units daily.  Will recheck and replete as needed.  - VITAMIN D,25 HYDROXY; Future  - Initial Annual Wellness Visit - Includes PPPS ()    8. Need for vaccination  - Tdap Vaccine =>6YO IM  - Prevnar-13 Vaccine (PCV13)  - Initial Annual Wellness Visit - Includes PPPS ()    Services suggested: No services needed at this time  Health Care Screening recommendations as per orders if indicated.  Referrals offered: PT/OT/Nutrition counseling/Behavioral Health/Smoking cessation as per orders if indicated.    Discussion today about general wellness and lifestyle habits:    · Prevent falls and reduce trip hazards; Cautioned about securing or removing rugs.  · Have a working fire alarm and carbon monoxide detector;   · Engage in regular physical activity and social activities       Follow-up: Return in about 6 months (around 3/24/2020), or if symptoms worsen or fail to improve.

## 2019-09-25 ENCOUNTER — TELEPHONE (OUTPATIENT)
Dept: MEDICAL GROUP | Facility: PHYSICIAN GROUP | Age: 74
End: 2019-09-25

## 2019-09-25 RX ORDER — ERGOCALCIFEROL 1.25 MG/1
50000 CAPSULE ORAL
Qty: 12 CAP | Refills: 0 | Status: SHIPPED | OUTPATIENT
Start: 2019-09-25 | End: 2020-01-07

## 2019-09-25 RX ORDER — PRAVASTATIN SODIUM 80 MG/1
80 TABLET ORAL DAILY
Qty: 100 TAB | Refills: 1 | Status: SHIPPED | OUTPATIENT
Start: 2019-09-25 | End: 2020-03-23

## 2019-09-28 DIAGNOSIS — E03.9 HYPOTHYROIDISM, UNSPECIFIED TYPE: ICD-10-CM

## 2019-09-28 RX ORDER — LEVOTHYROXINE SODIUM 0.05 MG/1
50 TABLET ORAL EVERY MORNING
Qty: 90 TAB | Refills: 1 | Status: SHIPPED | OUTPATIENT
Start: 2019-09-28 | End: 2020-03-19

## 2019-10-16 DIAGNOSIS — R19.5 POSITIVE COLORECTAL CANCER SCREENING USING COLOGUARD TEST: ICD-10-CM

## 2019-10-22 ENCOUNTER — HOSPITAL ENCOUNTER (OUTPATIENT)
Dept: LAB | Facility: MEDICAL CENTER | Age: 74
End: 2019-10-22
Attending: INTERNAL MEDICINE
Payer: MEDICARE

## 2019-10-22 PROCEDURE — 83516 IMMUNOASSAY NONANTIBODY: CPT

## 2019-10-22 PROCEDURE — 82784 ASSAY IGA/IGD/IGG/IGM EACH: CPT

## 2019-10-22 PROCEDURE — 36415 COLL VENOUS BLD VENIPUNCTURE: CPT

## 2019-10-24 LAB
IGA SERPL-MCNC: 100 MG/DL (ref 68–408)
TTG IGA SER IA-ACNC: 1 U/ML (ref 0–3)

## 2019-12-21 DIAGNOSIS — G47.00 INSOMNIA, UNSPECIFIED TYPE: ICD-10-CM

## 2019-12-23 RX ORDER — TRAZODONE HYDROCHLORIDE 50 MG/1
TABLET ORAL
Qty: 90 TAB | Refills: 1 | OUTPATIENT
Start: 2019-12-23

## 2020-01-07 ENCOUNTER — OFFICE VISIT (OUTPATIENT)
Dept: MEDICAL GROUP | Facility: PHYSICIAN GROUP | Age: 75
End: 2020-01-07
Payer: MEDICARE

## 2020-01-07 VITALS
SYSTOLIC BLOOD PRESSURE: 120 MMHG | HEIGHT: 65 IN | BODY MASS INDEX: 24.49 KG/M2 | TEMPERATURE: 98 F | DIASTOLIC BLOOD PRESSURE: 72 MMHG | WEIGHT: 147 LBS | OXYGEN SATURATION: 96 % | HEART RATE: 65 BPM

## 2020-01-07 DIAGNOSIS — E03.9 HYPOTHYROIDISM, UNSPECIFIED TYPE: ICD-10-CM

## 2020-01-07 DIAGNOSIS — K52.9 CHRONIC DIARRHEA: ICD-10-CM

## 2020-01-07 DIAGNOSIS — G25.0 BENIGN ESSENTIAL TREMOR: ICD-10-CM

## 2020-01-07 DIAGNOSIS — E78.5 DYSLIPIDEMIA: ICD-10-CM

## 2020-01-07 DIAGNOSIS — Z00.00 PREVENTATIVE HEALTH CARE: ICD-10-CM

## 2020-01-07 PROCEDURE — 99214 OFFICE O/P EST MOD 30 MIN: CPT | Performed by: FAMILY MEDICINE

## 2020-01-07 RX ORDER — DIPHENOXYLATE HYDROCHLORIDE AND ATROPINE SULFATE 2.5; .025 MG/1; MG/1
1 TABLET ORAL
Qty: 30 TAB | Refills: 0 | Status: SHIPPED | OUTPATIENT
Start: 2020-01-07 | End: 2020-01-07 | Stop reason: SDUPTHER

## 2020-01-07 RX ORDER — DIPHENOXYLATE HYDROCHLORIDE AND ATROPINE SULFATE 2.5; .025 MG/1; MG/1
1 TABLET ORAL
Qty: 30 TAB | Refills: 0 | Status: SHIPPED | OUTPATIENT
Start: 2020-01-07 | End: 2020-03-17 | Stop reason: SDUPTHER

## 2020-01-07 SDOH — HEALTH STABILITY: MENTAL HEALTH: HOW OFTEN DO YOU HAVE A DRINK CONTAINING ALCOHOL?: 2-4 TIMES A MONTH

## 2020-01-07 SDOH — HEALTH STABILITY: MENTAL HEALTH: HOW MANY STANDARD DRINKS CONTAINING ALCOHOL DO YOU HAVE ON A TYPICAL DAY?: 1 OR 2

## 2020-01-07 ASSESSMENT — PATIENT HEALTH QUESTIONNAIRE - PHQ9: CLINICAL INTERPRETATION OF PHQ2 SCORE: 0

## 2020-01-07 NOTE — PROGRESS NOTES
CC:  Chronic diarrhea    HISTORY OF THE PRESENT ILLNESS: Patient is a 74 y.o. female. This pleasant patient is here today to establish care discuss health problems below.    Patient has several fairly well-controlled health issues.  First of all she does have benign essential tremor.  Her tremor is only with action and never at rest.  It is mild in nature.  Not to the point where she wanted take a medication.    She also does take a statin for hyperlipidemia, pravastatin.  States that she is on a high dose per previous PCP and wondering if she can go on a lower dose.  She has no history of cardiovascular disease or stroke.  She feels she is having some symptoms of aching thighs with the higher dose and wants to see if she can go on a lower dose of the pravastatin.    She does have hypothyroidism.  She has been stable on levothyroxine 50 mcg for a long time now.  No concerns.    She does have chronic diarrhea.  She states that she has had this for many many years.  She takes Lomotil once in a while, maybe 4-5 times per month for diarrhea.  She plans to get her colonoscopy up-to-date in March.  She states that other medications which are over-the-counter cause her constipation after taking them.  She is asking for refills today.    Allergies: Patient has no known allergies.    Current Outpatient Medications Ordered in Epic   Medication Sig Dispense Refill   • diphenoxylate-atropine (LOMOTIL) 2.5-0.025 MG Tab Take 1 Tab by mouth 1 time daily as needed for Diarrhea for up to 30 days. AS NEEDED FOR DIARRHEA 30 Tab 0   • levothyroxine (SYNTHROID) 50 MCG Tab Take 1 Tab by mouth every morning. ON A EMPTY STOMACH 90 Tab 1   • pravastatin (PRAVACHOL) 80 MG tablet Take 1 Tab by mouth every day. 100 Tab 1     No current Epic-ordered facility-administered medications on file.        History reviewed. No pertinent past medical history.    History reviewed. No pertinent surgical history.    Social History     Tobacco Use   •  "Smoking status: Never Smoker   • Smokeless tobacco: Never Used   Substance Use Topics   • Alcohol use: Yes     Frequency: 2-4 times a month     Drinks per session: 1 or 2     Comment: occ    • Drug use: No       Social History     Patient does not qualify to have social determinant information on file (likely too young).   Social History Narrative   • Not on file       Family History   Problem Relation Age of Onset   • No Known Problems Mother    • No Known Problems Father    • Arthritis Sister    • No Known Problems Maternal Grandmother    • No Known Problems Maternal Grandfather    • No Known Problems Sister        ROS:     - Constitutional: Negative for fever, chills, unexpected weight change, and fatigue/generalized weakness.     - Respiratory: Negative for cough, sputum production, chest congestion, dyspnea, wheezing, and crackles.      - Cardiovascular: Negative for chest pain, palpitations, orthopnea, PND, and bilateral lower extremity edema.       Labs: Labs reviewed from 9/24/19 and discussed with patient.     Exam: /72 (BP Location: Left arm, Patient Position: Sitting, BP Cuff Size: Adult)   Pulse 65   Temp 36.7 °C (98 °F) (Temporal)   Ht 1.651 m (5' 5\")   Wt 66.7 kg (147 lb)   SpO2 96%  Body mass index is 24.46 kg/m².    General: Well appearing, NAD  Pulmonary: Clear to ausculation.  Normal effort. No rales, ronchi, or wheezing.  Cardiovascular: Regular rate and rhythm without murmur, rubs or gallop.   Skin: Warm and dry.  No obvious lesions.  Musculoskeletal:  No extremity cyanosis, clubbing, or edema.  Psych: Normal mood and affect. Alert and oriented. Judgment and insight is normal.    Please note that this dictation was created using voice recognition software. I have made every reasonable attempt to correct obvious errors, but I expect that there are errors of grammar and possibly content that I did not discover before finalizing the note.      Assessment/Plan  Poppy was seen today for " establish care, annual exam and medication refill.    Diagnoses and all orders for this visit:    Chronic diarrhea  Chronic intermittent issue for the patient.  I am okay with continuing to prescribe Lomotil as needed, as long as she stays up-to-date on her colon cancer screening.  She does agree to get in this coming March for her colonoscopy.  -     diphenoxylate-atropine (LOMOTIL) 2.5-0.025 MG Tab; Take 1 Tab by mouth 1 time daily as needed for Diarrhea for up to 30 days. AS NEEDED FOR DIARRHEA  -     Comp Metabolic Panel; Future    Obtained and reviewed patient utilization report from [facility] pharmacy database on 1/7/2020 1:03 PM  prior to writing prescription for controlled substance II, III or IV per [place] bill . Based on assessment of the report,my physical exam if necessary and the patient's health problem, the prescription is medically necessary.       Dyslipidemia  Chronic issue for the patient.  Given her symptoms of muscle aches, I have asked her to cut her pravastatin in half to 40 mg a day which she agrees to.  We will then check lipid profile prior to her next visit about 6 months from now.  -     Lipid Profile; Future    Hypothyroidism, unspecified type  Chronic well-controlled problem for the patient.  Continue levothyroxine.  Check TSH prior to next visit.  -     TSH WITH REFLEX TO FT4; Future    Benign essential tremor  Mild chronic issue.  At this time well to help with symptoms.  As tremors not resting, do not suspect parkinsonism.    Follow up in 6 months or sooner if needed.     Juliet Willis DO  [facility]

## 2020-03-17 DIAGNOSIS — K52.9 CHRONIC DIARRHEA: ICD-10-CM

## 2020-03-17 DIAGNOSIS — E03.9 HYPOTHYROIDISM, UNSPECIFIED TYPE: ICD-10-CM

## 2020-03-17 RX ORDER — DIPHENOXYLATE HYDROCHLORIDE AND ATROPINE SULFATE 2.5; .025 MG/1; MG/1
1 TABLET ORAL
Qty: 30 TAB | Refills: 0 | Status: SHIPPED | OUTPATIENT
Start: 2020-03-17 | End: 2020-03-17 | Stop reason: SDUPTHER

## 2020-03-17 RX ORDER — DIPHENOXYLATE HYDROCHLORIDE AND ATROPINE SULFATE 2.5; .025 MG/1; MG/1
1 TABLET ORAL
Qty: 30 TAB | Refills: 0 | Status: SHIPPED
Start: 2020-03-17 | End: 2020-06-01 | Stop reason: SDUPTHER

## 2020-03-18 DIAGNOSIS — E03.9 HYPOTHYROIDISM, UNSPECIFIED TYPE: ICD-10-CM

## 2020-03-19 RX ORDER — LEVOTHYROXINE SODIUM 0.05 MG/1
TABLET ORAL
Qty: 90 TAB | Refills: 1 | Status: SHIPPED | OUTPATIENT
Start: 2020-03-19 | End: 2020-09-16

## 2020-03-19 RX ORDER — LEVOTHYROXINE SODIUM 0.05 MG/1
50 TABLET ORAL EVERY MORNING
Qty: 90 TAB | Refills: 0 | OUTPATIENT
Start: 2020-03-19

## 2020-03-19 NOTE — TELEPHONE ENCOUNTER
Was the patient seen in the last year in this department? Yes    Does patient have an active prescription for medications requested? No     Received Request Via: Pharmacy    Pt met protocol?: Yes     Last OV 01/07/2020    TSH   Date Value Ref Range Status   09/24/2019 2.840 0.380 - 5.330 uIU/mL Final     Comment:     Please note new reference ranges effective 12/14/2017 10:00 AM  Pregnant Females, 1st Trimester  0.050-3.700  Pregnant Females, 2nd Trimester  0.310-4.350  Pregnant Females, 3rd Trimester  0.410-5.180

## 2020-03-23 RX ORDER — PRAVASTATIN SODIUM 80 MG/1
TABLET ORAL
Qty: 100 TAB | Refills: 1 | Status: SHIPPED | OUTPATIENT
Start: 2020-03-23 | End: 2020-10-05

## 2020-03-23 NOTE — TELEPHONE ENCOUNTER
Pt has had OV within the 12 month protocol and lipid panel is current. 6 month supply sent to pharmacy.   Lab Results   Component Value Date/Time    CHOLSTRLTOT 280 (H) 09/24/2019 11:22 AM     (H) 09/24/2019 11:22 AM    HDL 57 09/24/2019 11:22 AM    TRIGLYCERIDE 214 (H) 09/24/2019 11:22 AM       Lab Results   Component Value Date/Time    SODIUM 141 09/24/2019 11:22 AM    POTASSIUM 4.0 09/24/2019 11:22 AM    CHLORIDE 103 09/24/2019 11:22 AM    CO2 27 09/24/2019 11:22 AM    GLUCOSE 86 09/24/2019 11:22 AM    BUN 11 09/24/2019 11:22 AM    CREATININE 0.74 09/24/2019 11:22 AM     Lab Results   Component Value Date/Time    ALKPHOSPHAT 66 09/24/2019 11:22 AM    ASTSGOT 24 09/24/2019 11:22 AM    ALTSGPT 18 09/24/2019 11:22 AM    TBILIRUBIN 0.6 09/24/2019 11:22 AM

## 2020-03-23 NOTE — TELEPHONE ENCOUNTER
Was the patient seen in the last year in this department? Yes    Does patient have an active prescription for medications requested? No     Received Request Via: Pharmacy      Pt met protocol?: Yes   Last ov 1/2020

## 2020-05-01 ENCOUNTER — TELEPHONE (OUTPATIENT)
Dept: MEDICAL GROUP | Facility: PHYSICIAN GROUP | Age: 75
End: 2020-05-01

## 2020-05-01 DIAGNOSIS — R30.0 DYSURIA: ICD-10-CM

## 2020-05-01 NOTE — TELEPHONE ENCOUNTER
----- Message from Juliet Willis D.O. sent at 5/1/2020  2:50 PM PDT -----  Regarding: FW:Virtual Visits Now Available!  Contact: [phone]  Please call patient and let her know that I have ordered urinalysis and culture for her. She can come to the lab to get this done. If that is negative then either in person or virtual visit needs to be scheduled.   ----- Message -----  From: Poppy Puente  Sent: 4/30/2020   8:11 AM PDT  To: Juliet Willis D.O.  Subject: RE:Virtual Visits Now Available!                 I think I might be getting a bladder/urinary tract infection.  I have had them before and my previous doctor provided me a prescription for Bactrim DS tab 800-160 for when they occurred.  I have been using AZO with cranberry when I suspect a problem.  This time I have a sharp pain in my lower back on the right side.  I thought it was just a pulled muscle but it is not acting like that.  It started 2 days ago.  I did a little research and was wondering if it could be associated with kidney infection or bladder infection.  Do I need an antibiotic or what other options to I have?  Please advise.  Poppy Puente

## 2020-05-05 ENCOUNTER — HOSPITAL ENCOUNTER (OUTPATIENT)
Dept: LAB | Facility: MEDICAL CENTER | Age: 75
End: 2020-05-05
Attending: FAMILY MEDICINE
Payer: MEDICARE

## 2020-05-05 DIAGNOSIS — R30.0 DYSURIA: ICD-10-CM

## 2020-05-05 LAB
AMORPH CRY #/AREA URNS HPF: PRESENT /HPF
APPEARANCE UR: CLEAR
BACTERIA #/AREA URNS HPF: NEGATIVE /HPF
BILIRUB UR QL STRIP.AUTO: NEGATIVE
COLOR UR: YELLOW
EPI CELLS #/AREA URNS HPF: NEGATIVE /HPF
GLUCOSE UR STRIP.AUTO-MCNC: NEGATIVE MG/DL
HYALINE CASTS #/AREA URNS LPF: NORMAL /LPF
KETONES UR STRIP.AUTO-MCNC: NEGATIVE MG/DL
LEUKOCYTE ESTERASE UR QL STRIP.AUTO: ABNORMAL
MICRO URNS: ABNORMAL
NITRITE UR QL STRIP.AUTO: NEGATIVE
PH UR STRIP.AUTO: 6 [PH] (ref 5–8)
PROT UR QL STRIP: NEGATIVE MG/DL
RBC # URNS HPF: NORMAL /HPF
RBC UR QL AUTO: NEGATIVE
SP GR UR STRIP.AUTO: 1.02
UROBILINOGEN UR STRIP.AUTO-MCNC: 0.2 MG/DL
WBC #/AREA URNS HPF: NORMAL /HPF

## 2020-05-05 PROCEDURE — 87077 CULTURE AEROBIC IDENTIFY: CPT

## 2020-05-05 PROCEDURE — 81001 URINALYSIS AUTO W/SCOPE: CPT

## 2020-05-05 PROCEDURE — 87186 SC STD MICRODIL/AGAR DIL: CPT

## 2020-05-05 PROCEDURE — 87086 URINE CULTURE/COLONY COUNT: CPT

## 2020-05-07 LAB
BACTERIA UR CULT: ABNORMAL
BACTERIA UR CULT: ABNORMAL
SIGNIFICANT IND 70042: ABNORMAL
SITE SITE: ABNORMAL
SOURCE SOURCE: ABNORMAL

## 2020-05-08 RX ORDER — SULFAMETHOXAZOLE AND TRIMETHOPRIM 800; 160 MG/1; MG/1
1 TABLET ORAL 2 TIMES DAILY
Qty: 6 TAB | Refills: 0 | Status: SHIPPED | OUTPATIENT
Start: 2020-05-08 | End: 2020-05-11

## 2020-06-01 DIAGNOSIS — K52.9 CHRONIC DIARRHEA: ICD-10-CM

## 2020-06-01 RX ORDER — DIPHENOXYLATE HYDROCHLORIDE AND ATROPINE SULFATE 2.5; .025 MG/1; MG/1
1 TABLET ORAL
Qty: 30 TAB | Refills: 0 | Status: SHIPPED | OUTPATIENT
Start: 2020-06-01 | End: 2020-07-01

## 2020-08-13 DIAGNOSIS — K52.9 CHRONIC DIARRHEA: ICD-10-CM

## 2020-08-14 RX ORDER — DIPHENOXYLATE HYDROCHLORIDE AND ATROPINE SULFATE 2.5; .025 MG/1; MG/1
1 TABLET ORAL
Qty: 30 TAB | Refills: 0 | OUTPATIENT
Start: 2020-08-14 | End: 2020-09-13

## 2020-08-27 ENCOUNTER — HOSPITAL ENCOUNTER (OUTPATIENT)
Dept: LAB | Facility: MEDICAL CENTER | Age: 75
End: 2020-08-27
Attending: FAMILY MEDICINE
Payer: MEDICARE

## 2020-08-27 DIAGNOSIS — K52.9 CHRONIC DIARRHEA: ICD-10-CM

## 2020-08-27 DIAGNOSIS — E03.9 HYPOTHYROIDISM, UNSPECIFIED TYPE: ICD-10-CM

## 2020-08-27 DIAGNOSIS — E78.5 DYSLIPIDEMIA: ICD-10-CM

## 2020-08-27 LAB
ALBUMIN SERPL BCP-MCNC: 4.5 G/DL (ref 3.2–4.9)
ALBUMIN/GLOB SERPL: 2 G/DL
ALP SERPL-CCNC: 69 U/L (ref 30–99)
ALT SERPL-CCNC: 21 U/L (ref 2–50)
ANION GAP SERPL CALC-SCNC: 10 MMOL/L (ref 7–16)
AST SERPL-CCNC: 26 U/L (ref 12–45)
BILIRUB SERPL-MCNC: 0.6 MG/DL (ref 0.1–1.5)
BUN SERPL-MCNC: 11 MG/DL (ref 8–22)
CALCIUM SERPL-MCNC: 9.1 MG/DL (ref 8.5–10.5)
CHLORIDE SERPL-SCNC: 97 MMOL/L (ref 96–112)
CHOLEST SERPL-MCNC: 220 MG/DL (ref 100–199)
CO2 SERPL-SCNC: 26 MMOL/L (ref 20–33)
CREAT SERPL-MCNC: 0.89 MG/DL (ref 0.5–1.4)
GLOBULIN SER CALC-MCNC: 2.3 G/DL (ref 1.9–3.5)
GLUCOSE SERPL-MCNC: 95 MG/DL (ref 65–99)
HDLC SERPL-MCNC: 56 MG/DL
LDLC SERPL CALC-MCNC: 132 MG/DL
POTASSIUM SERPL-SCNC: 4 MMOL/L (ref 3.6–5.5)
PROT SERPL-MCNC: 6.8 G/DL (ref 6–8.2)
SODIUM SERPL-SCNC: 133 MMOL/L (ref 135–145)
TRIGL SERPL-MCNC: 158 MG/DL (ref 0–149)
TSH SERPL DL<=0.005 MIU/L-ACNC: 4.78 UIU/ML (ref 0.38–5.33)

## 2020-08-27 PROCEDURE — 80061 LIPID PANEL: CPT

## 2020-08-27 PROCEDURE — 84443 ASSAY THYROID STIM HORMONE: CPT

## 2020-08-27 PROCEDURE — 36415 COLL VENOUS BLD VENIPUNCTURE: CPT

## 2020-08-27 PROCEDURE — 80053 COMPREHEN METABOLIC PANEL: CPT

## 2020-09-09 ENCOUNTER — HOSPITAL ENCOUNTER (OUTPATIENT)
Dept: LAB | Facility: MEDICAL CENTER | Age: 75
End: 2020-09-09
Attending: FAMILY MEDICINE
Payer: MEDICARE

## 2020-09-09 DIAGNOSIS — R30.0 DYSURIA: ICD-10-CM

## 2020-09-09 PROCEDURE — 87086 URINE CULTURE/COLONY COUNT: CPT

## 2020-09-12 LAB
BACTERIA UR CULT: NORMAL
SIGNIFICANT IND 70042: NORMAL
SITE SITE: NORMAL
SOURCE SOURCE: NORMAL

## 2020-09-15 DIAGNOSIS — E03.9 HYPOTHYROIDISM, UNSPECIFIED TYPE: ICD-10-CM

## 2020-09-16 RX ORDER — LEVOTHYROXINE SODIUM 0.05 MG/1
TABLET ORAL
Qty: 90 TAB | Refills: 2 | Status: SHIPPED | OUTPATIENT
Start: 2020-09-16 | End: 2021-06-14

## 2020-09-16 NOTE — TELEPHONE ENCOUNTER
Last seen by PCP 1/7/2020.     TSH   Date Value Ref Range Status   08/27/2020 4.780 0.380 - 5.330 uIU/mL Final     Comment:     Please note new reference ranges effective 12/14/2017 10:00 AM  Pregnant Females, 1st Trimester  0.050-3.700  Pregnant Females, 2nd Trimester  0.310-4.350  Pregnant Females, 3rd Trimester  0.410-5.180         Will send 9 month(s) to the pharmacy

## 2020-10-05 ENCOUNTER — HOSPITAL ENCOUNTER (OUTPATIENT)
Dept: LAB | Facility: MEDICAL CENTER | Age: 75
End: 2020-10-05
Attending: FAMILY MEDICINE
Payer: MEDICARE

## 2020-10-05 ENCOUNTER — OFFICE VISIT (OUTPATIENT)
Dept: MEDICAL GROUP | Facility: PHYSICIAN GROUP | Age: 75
End: 2020-10-05
Payer: MEDICARE

## 2020-10-05 VITALS
HEIGHT: 63 IN | WEIGHT: 144 LBS | DIASTOLIC BLOOD PRESSURE: 62 MMHG | BODY MASS INDEX: 25.52 KG/M2 | OXYGEN SATURATION: 95 % | SYSTOLIC BLOOD PRESSURE: 104 MMHG | HEART RATE: 70 BPM | TEMPERATURE: 98.2 F

## 2020-10-05 DIAGNOSIS — R61 NIGHT SWEATS: ICD-10-CM

## 2020-10-05 DIAGNOSIS — E55.9 VITAMIN D DEFICIENCY: ICD-10-CM

## 2020-10-05 DIAGNOSIS — E78.5 DYSLIPIDEMIA: ICD-10-CM

## 2020-10-05 DIAGNOSIS — K52.9 CHRONIC DIARRHEA: ICD-10-CM

## 2020-10-05 DIAGNOSIS — E03.9 HYPOTHYROIDISM, UNSPECIFIED TYPE: ICD-10-CM

## 2020-10-05 DIAGNOSIS — G25.0 BENIGN ESSENTIAL TREMOR: ICD-10-CM

## 2020-10-05 DIAGNOSIS — Z23 NEED FOR VACCINATION: ICD-10-CM

## 2020-10-05 DIAGNOSIS — H53.9 VISUAL DISTURBANCE: ICD-10-CM

## 2020-10-05 DIAGNOSIS — Z00.00 MEDICARE ANNUAL WELLNESS VISIT, SUBSEQUENT: Primary | ICD-10-CM

## 2020-10-05 PROBLEM — G47.00 INSOMNIA: Status: RESOLVED | Noted: 2018-07-18 | Resolved: 2020-10-05

## 2020-10-05 LAB
25(OH)D3 SERPL-MCNC: 43 NG/ML (ref 30–100)
BASOPHILS # BLD AUTO: 0.9 % (ref 0–1.8)
BASOPHILS # BLD: 0.08 K/UL (ref 0–0.12)
EOSINOPHIL # BLD AUTO: 0.09 K/UL (ref 0–0.51)
EOSINOPHIL NFR BLD: 1 % (ref 0–6.9)
ERYTHROCYTE [DISTWIDTH] IN BLOOD BY AUTOMATED COUNT: 49.3 FL (ref 35.9–50)
HCT VFR BLD AUTO: 42.7 % (ref 37–47)
HGB BLD-MCNC: 13.7 G/DL (ref 12–16)
IMM GRANULOCYTES # BLD AUTO: 0.02 K/UL (ref 0–0.11)
IMM GRANULOCYTES NFR BLD AUTO: 0.2 % (ref 0–0.9)
LYMPHOCYTES # BLD AUTO: 1.68 K/UL (ref 1–4.8)
LYMPHOCYTES NFR BLD: 19.4 % (ref 22–41)
MCH RBC QN AUTO: 31.3 PG (ref 27–33)
MCHC RBC AUTO-ENTMCNC: 32.1 G/DL (ref 33.6–35)
MCV RBC AUTO: 97.5 FL (ref 81.4–97.8)
MONOCYTES # BLD AUTO: 0.63 K/UL (ref 0–0.85)
MONOCYTES NFR BLD AUTO: 7.3 % (ref 0–13.4)
NEUTROPHILS # BLD AUTO: 6.17 K/UL (ref 2–7.15)
NEUTROPHILS NFR BLD: 71.2 % (ref 44–72)
NRBC # BLD AUTO: 0 K/UL
NRBC BLD-RTO: 0 /100 WBC
PLATELET # BLD AUTO: 301 K/UL (ref 164–446)
PMV BLD AUTO: 12.1 FL (ref 9–12.9)
RBC # BLD AUTO: 4.38 M/UL (ref 4.2–5.4)
WBC # BLD AUTO: 8.7 K/UL (ref 4.8–10.8)

## 2020-10-05 PROCEDURE — 90662 IIV NO PRSV INCREASED AG IM: CPT | Performed by: FAMILY MEDICINE

## 2020-10-05 PROCEDURE — G0009 ADMIN PNEUMOCOCCAL VACCINE: HCPCS | Performed by: FAMILY MEDICINE

## 2020-10-05 PROCEDURE — G0439 PPPS, SUBSEQ VISIT: HCPCS | Performed by: FAMILY MEDICINE

## 2020-10-05 PROCEDURE — G0008 ADMIN INFLUENZA VIRUS VAC: HCPCS | Performed by: FAMILY MEDICINE

## 2020-10-05 PROCEDURE — 82306 VITAMIN D 25 HYDROXY: CPT

## 2020-10-05 PROCEDURE — 36415 COLL VENOUS BLD VENIPUNCTURE: CPT

## 2020-10-05 PROCEDURE — 85025 COMPLETE CBC W/AUTO DIFF WBC: CPT

## 2020-10-05 PROCEDURE — 90732 PPSV23 VACC 2 YRS+ SUBQ/IM: CPT | Performed by: FAMILY MEDICINE

## 2020-10-05 RX ORDER — DIPHENOXYLATE HYDROCHLORIDE AND ATROPINE SULFATE 2.5; .025 MG/1; MG/1
1 TABLET ORAL
Qty: 30 TAB | Refills: 0 | Status: SHIPPED | OUTPATIENT
Start: 2020-10-05 | End: 2020-10-05 | Stop reason: SDUPTHER

## 2020-10-05 RX ORDER — DIPHENOXYLATE HYDROCHLORIDE AND ATROPINE SULFATE 2.5; .025 MG/1; MG/1
1 TABLET ORAL
Qty: 30 TAB | Refills: 0 | Status: SHIPPED | OUTPATIENT
Start: 2020-10-05 | End: 2020-12-16 | Stop reason: SDUPTHER

## 2020-10-05 RX ORDER — PRAVASTATIN SODIUM 40 MG
40 TABLET ORAL DAILY
Qty: 90 TAB | Refills: 3 | Status: SHIPPED | OUTPATIENT
Start: 2020-10-05 | End: 2021-10-11 | Stop reason: SDUPTHER

## 2020-10-05 ASSESSMENT — ENCOUNTER SYMPTOMS: GENERAL WELL-BEING: GOOD

## 2020-10-05 ASSESSMENT — PATIENT HEALTH QUESTIONNAIRE - PHQ9: CLINICAL INTERPRETATION OF PHQ2 SCORE: 0

## 2020-10-05 ASSESSMENT — ACTIVITIES OF DAILY LIVING (ADL): BATHING_REQUIRES_ASSISTANCE: 0

## 2020-10-05 NOTE — PROGRESS NOTES
"Chief Complaint   Patient presents with   • Annual Wellness Visit       HPI:  Poppy is a 75 y.o. here for Medicare Annual Wellness Visit    Patient is here for annual wellness visit today.  She reports that she is doing quite well with her current medications 40 mg of pravastatin daily and Synthroid.  Recent labs showed thyroid within normal limits.  Cholesterol remains elevated, but patient has not tolerated higher dose of statin or other statins in the past.    She does have a few additional concerns she notes that she has been intermittently getting drenching night sweats.  Though she did recently have lab work done, it did not include CBC, she was not having any specific concerns at that time.    Patient is also noting concerns for Parkinson's.  She does have known benign essential tremor.  Tremor is typically an intention tremor.  She believes it is not worsening over time.  However, she is now endorsing that she \"sees things that are not there\".  For example, she will think she sees the dog out of the corner of her eye, but it ends up not being there.  This is been going on for about 6 months now.  She notes no other visual or auditory hallucination type symptoms.  She feels that the symptoms are not worsening.  She read somewhere that this could be a symptom of Parkinson's.  She also endorses that she is under very significant stress.  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  She is wondering if it could be related to stress.  Otherwise from a Parkinson's standpoint she denies muscle rigidity, changes in gait, memory loss.    Patient also wanting to know if she can get refills on her Lomotil.  She will maybe take 5 to 10 tablets/month.  She has known chronic diarrhea which has been worked up with colonoscopy.  Over-the-counter medications tend to cause constipation for her but Lomotil does not.    Patient Active Problem List    Diagnosis Date Noted "   • Vitamin D deficiency 10/05/2020   • Visual disturbance 10/05/2020   • Chronic diarrhea 03/15/2019   • Dyslipidemia 07/18/2018   • Hypothyroidism 07/18/2018   • Benign essential tremor 07/18/2018       Current Outpatient Medications   Medication Sig Dispense Refill   • pravastatin (PRAVACHOL) 40 MG tablet Take 1 Tab by mouth every day. 90 Tab 3   • diphenoxylate-atropine (LOMOTIL) 2.5-0.025 MG Tab Take 1 Tab by mouth 1 time daily as needed for Diarrhea for up to 30 days. 30 Tab 0   • levothyroxine (SYNTHROID) 50 MCG Tab TAKE 1 TABLET BY MOUTH EVERY MORNING ON AN EMPTY STOMACH 90 Tab 2     No current facility-administered medications for this visit.         Patient is taking medications as noted in medication list.  Current supplements as per medication list.     Allergies: Bactrim [sulfamethoxazole w-trimethoprim]    Current social contact/activities: [employer]     Is patient current with immunizations? No, due for FLU, PNEUMOVAX (PPSV23) and SHINGRIX (Shingles). Patient is interested in receiving FLU and PNEUMOVAX (PPSV23) today.    She  reports that she has never smoked. She has never used smokeless tobacco. She reports current alcohol use. She reports that she does not use drugs.  Counseling given: Not Answered        DPA/Advanced directive: Patient does not have an Advanced Directive.  A packet and workshop information was given on Advanced Directives.    ROS:    Gait: Uses no assistive device   Ostomy: No   Other tubes: No   Amputations: No   Chronic oxygen use No   Last eye exam X1 YEAR CANNOT RECALL DOCTOR OFFICE OR NAME  Wears hearing aids: No   : Denies any urinary leakage during the last 6 months      Screening:        Depression Screening    Little interest or pleasure in doing things?  0 - not at all  Feeling down, depressed, or hopeless? 0 - not at all  Patient Health Questionnaire Score: 0    If depressive symptoms identified deferred to follow up visit unless specifically addressed  in assessment and plan.    Interpretation of PHQ-9 Total Score   Score Severity   1-4 No Depression   5-9 Mild Depression   10-14 Moderate Depression   15-19 Moderately Severe Depression   20-27 Severe Depression    Screening for Cognitive Impairment    Three Minute Recall (river, nation, finger)  3/3    Rocky clock face with all 12 numbers and set the hands to show 10 past 11.  Yes    If cognitive concerns identified, deferred for follow up unless specifically addressed in assessment and plan.    Fall Risk Assessment    Has the patient had two or more falls in the last year or any fall with injury in the last year?  No  If fall risk identified, deferred for follow up unless specifically addressed in assessment and plan.    Safety Assessment    Throw rugs on floor.  No  Handrails on all stairs.  Yes  Good lighting in all hallways.  Yes  Difficulty hearing.  No  Patient counseled about all safety risks that were identified.    Functional Assessment ADLs    Are there any barriers preventing you from cooking for yourself or meeting nutritional needs?  No.    Are there any barriers preventing you from driving safely or obtaining transportation?  No.    Are there any barriers preventing you from using a telephone or calling for help?  No.    Are there any barriers preventing you from shopping?  No.    Are there any barriers preventing you from taking care of your own finances?  No.    Are there any barriers preventing you from managing your medications?  No.    Are there any barriers preventing you from showering, bathing or dressing yourself?  No.    Are you currently engaging in any exercise or physical activity?  Yes.  A little bit of walking and yard work  What is your perception of your health?  Good.    Health Maintenance Summary                IMM ZOSTER VACCINES Overdue 7/29/1994     IMM INFLUENZA Overdue 9/1/2020     Annual Wellness Visit Overdue 9/24/2020      Done 9/24/2019 INITIAL ANNUAL WELLNESS  "VISIT-INCLUDES PPPS ()    MAMMOGRAM Postponed 1/7/2021 Originally 8/9/2019. Patient Refused     Done 8/9/2018 MA-MAMMO SCREENING BILAT W/TOMOSYNTHESIS W/CAD     Patient has more history with this topic...    BONE DENSITY Next Due 11/10/2020      Done 11/10/2015 DS-BONE DENSITY STUDY (DEXA)    COLOGUARD STOOL DNA Next Due 10/9/2022      Done 10/9/2019 COLOGUARD COLON CANCER SCREENING    IMM PNEUMOCOCCAL VACCINE: 65+ Years Next Due 10/5/2025      Done 9/24/2019 Imm Admin: Pneumococcal Conjugate Vaccine (Prevnar/PCV-13)    IMM DTaP/Tdap/Td Vaccine Next Due 9/24/2029      Done 9/24/2019 Imm Admin: Tdap Vaccine          Patient Care Team:  Juliet Willis D.O. as PCP - General (Family Medicine)  Janel Carlson O.D. as Consulting Physician (Optometry)  Isabella Brannon MD  as Consulting Physician (Family Medicine)    Social History     Tobacco Use   • Smoking status: Never Smoker   • Smokeless tobacco: Never Used   Substance Use Topics   • Alcohol use: Yes     Frequency: 2-4 times a month     Drinks per session: 1 or 2     Comment: occ    • Drug use: No     Family History   Problem Relation Age of Onset   • No Known Problems Mother    • No Known Problems Father    • Arthritis Sister    • No Known Problems Maternal Grandmother    • No Known Problems Maternal Grandfather    • No Known Problems Sister      She  has no past medical history on file.   History reviewed. No pertinent surgical history.        Exam:     /62 (BP Location: Left arm, Patient Position: Sitting, BP Cuff Size: Adult)   Pulse 70   Temp 36.8 °C (98.2 °F)   Ht 1.6 m (5' 3\")   Wt 65.3 kg (144 lb)   SpO2 95%  Body mass index is 25.51 kg/m².    Hearing excellent.    Dentition good  Alert, oriented in no acute distress.  Eye contact is good, speech goal directed, affect calm      Assessment and Plan. The following treatment and monitoring plan is recommended:    Poppy was seen today for annual wellness visit.    Diagnoses and all " orders for this visit:    Medicare annual wellness visit, subsequent    Need for vaccination  -     INFLUENZA VACCINE, HIGH DOSE (65+ ONLY)  -     Pneumoccocal Polysaccharide Vaccine 23-Valent =>1yo SQ/IM    Chronic diarrhea  Chronic issue for the patient, no known cause after work-up.  She has been on Lomotil for quite some time as it does not cause constipation in her.  I have refilled this for her today.  -     diphenoxylate-atropine (LOMOTIL) 2.5-0.025 MG Tab; Take 1 Tab by mouth 1 time daily as needed for Diarrhea for up to 30 days.    Obtained and reviewed patient utilization report from [facility] pharmacy database on 10/5/2020 1:17 PM  prior to writing prescription for controlled substance II, III or IV per [place] bill . Based on assessment of the report,my physical exam if necessary and the patient's health problem, the prescription is medically necessary.       Night sweats  Newly reported issue.  Recommend adding CBC.  -     CBC WITH DIFFERENTIAL; Future    Vitamin D deficiency  Chronic issue, patient is requesting recheck with new labs.  -     VITAMIN D,25 HYDROXY; Future    Benign essential tremor  Ongoing issue.  Patient reports symptoms are not worsening.  Concern for Parkinson's as below.    Hypothyroidism, unspecified type  Chronic, well-controlled problem.  Continue current dose of levothyroxine.    Dyslipidemia  Chronic issue.  She remains elevated on pravastatin but has not tolerated higher dose or alternative statins.  Recommend we continue at this time.  -     pravastatin (PRAVACHOL) 40 MG tablet; Take 1 Tab by mouth every day.    Visual disturbance  Patient notes intermittently seeing things out of the corner of Eyes and Then turning and thinking they are not necessarily there such as her pets.  Unclear etiology.  She does not report any other auditory or visual hallucinations.  She reports being up-to-date on her eye exam.  With regard to her concerns for Parkinson's, I am less  concerned for that, but did recommend brain MRI and neurology referral.  Patient feels she cannot handle that at this time and since symptoms are not worsening she prefers to just continue to monitor.  She does endorse being under stress, but no anxiety or depression.  Patient agrees to let me know if symptoms worsen at all or change at all.    Services suggested: No services needed at this time  Health Care Screening recommendations as per orders if indicated.  Referrals offered: PT/OT/Nutrition counseling/Behavioral Health/Smoking cessation as per orders if indicated.    Discussion today about general wellness and lifestyle habits:    · Prevent falls and reduce trip hazards; Cautioned about securing or removing rugs.  · Have a working fire alarm and carbon monoxide detector;   · Engage in regular physical activity and social activities       Follow-up: Return in about 6 months (around 4/5/2021), or if symptoms worsen or fail to improve.

## 2020-12-16 DIAGNOSIS — K52.9 CHRONIC DIARRHEA: ICD-10-CM

## 2020-12-18 RX ORDER — DIPHENOXYLATE HYDROCHLORIDE AND ATROPINE SULFATE 2.5; .025 MG/1; MG/1
1 TABLET ORAL
Qty: 30 TAB | Refills: 0 | Status: SHIPPED | OUTPATIENT
Start: 2020-12-18 | End: 2021-01-17

## 2021-01-11 DIAGNOSIS — Z23 NEED FOR VACCINATION: ICD-10-CM

## 2021-01-17 ENCOUNTER — IMMUNIZATION (OUTPATIENT)
Dept: FAMILY PLANNING/WOMEN'S HEALTH CLINIC | Facility: IMMUNIZATION CENTER | Age: 76
End: 2021-01-17
Attending: INTERNAL MEDICINE
Payer: MEDICARE

## 2021-01-17 DIAGNOSIS — Z23 NEED FOR VACCINATION: ICD-10-CM

## 2021-01-17 DIAGNOSIS — Z23 ENCOUNTER FOR VACCINATION: Primary | ICD-10-CM

## 2021-01-17 PROCEDURE — 91301 MODERNA SARS-COV-2 VACCINE: CPT

## 2021-01-17 PROCEDURE — 0011A MODERNA SARS-COV-2 VACCINE: CPT

## 2021-02-14 ENCOUNTER — IMMUNIZATION (OUTPATIENT)
Dept: FAMILY PLANNING/WOMEN'S HEALTH CLINIC | Facility: IMMUNIZATION CENTER | Age: 76
End: 2021-02-14
Attending: INTERNAL MEDICINE
Payer: MEDICARE

## 2021-02-14 DIAGNOSIS — Z23 ENCOUNTER FOR VACCINATION: Primary | ICD-10-CM

## 2021-02-14 PROCEDURE — 91301 MODERNA SARS-COV-2 VACCINE: CPT

## 2021-02-14 PROCEDURE — 0012A MODERNA SARS-COV-2 VACCINE: CPT

## 2021-06-13 DIAGNOSIS — E03.9 HYPOTHYROIDISM, UNSPECIFIED TYPE: ICD-10-CM

## 2021-06-15 RX ORDER — LEVOTHYROXINE SODIUM 0.05 MG/1
TABLET ORAL
Qty: 90 TABLET | Refills: 1 | Status: SHIPPED | OUTPATIENT
Start: 2021-06-15 | End: 2021-09-07 | Stop reason: SDUPTHER

## 2021-09-07 ENCOUNTER — TELEPHONE (OUTPATIENT)
Dept: MEDICAL GROUP | Facility: PHYSICIAN GROUP | Age: 76
End: 2021-09-07

## 2021-09-07 DIAGNOSIS — E03.9 HYPOTHYROIDISM, UNSPECIFIED TYPE: ICD-10-CM

## 2021-09-07 DIAGNOSIS — E55.9 VITAMIN D DEFICIENCY: ICD-10-CM

## 2021-09-07 DIAGNOSIS — E78.5 DYSLIPIDEMIA: ICD-10-CM

## 2021-09-07 DIAGNOSIS — Z00.00 ENCOUNTER FOR PREVENTIVE CARE: ICD-10-CM

## 2021-09-07 RX ORDER — LEVOTHYROXINE SODIUM 0.05 MG/1
50 TABLET ORAL EVERY MORNING
Qty: 90 TABLET | Refills: 0 | Status: SHIPPED | OUTPATIENT
Start: 2021-09-07 | End: 2021-10-11 | Stop reason: SDUPTHER

## 2021-09-07 NOTE — TELEPHONE ENCOUNTER
----- Message from Poppy Puente sent at 9/6/2021  8:41 AM PDT -----  Regarding: medication refill vs well care appt.  I will run out of my thyroid medication on Oct. 2.  Can you refill it or do I need to make an appt.  I believe my wellness appt. last year was Oct. 5.  Are you still doing wellness visits or do I need a regular appt.  If I need to come in for a visit, I would prefer to do the blood work prior to the visit so that you have the results when I visit.   Please advise.  Thank you,  Poppy [email or web address]

## 2021-10-04 ENCOUNTER — HOSPITAL ENCOUNTER (OUTPATIENT)
Dept: LAB | Facility: MEDICAL CENTER | Age: 76
End: 2021-10-04
Attending: FAMILY MEDICINE
Payer: MEDICARE

## 2021-10-04 DIAGNOSIS — E03.9 HYPOTHYROIDISM, UNSPECIFIED TYPE: ICD-10-CM

## 2021-10-04 DIAGNOSIS — E55.9 VITAMIN D DEFICIENCY: ICD-10-CM

## 2021-10-04 DIAGNOSIS — E78.5 DYSLIPIDEMIA: ICD-10-CM

## 2021-10-04 DIAGNOSIS — Z00.00 ENCOUNTER FOR PREVENTIVE CARE: ICD-10-CM

## 2021-10-04 LAB
25(OH)D3 SERPL-MCNC: 54 NG/ML (ref 30–100)
ALBUMIN SERPL BCP-MCNC: 4.2 G/DL (ref 3.2–4.9)
ALBUMIN/GLOB SERPL: 1.6 G/DL
ALP SERPL-CCNC: 66 U/L (ref 30–99)
ALT SERPL-CCNC: 17 U/L (ref 2–50)
ANION GAP SERPL CALC-SCNC: 10 MMOL/L (ref 7–16)
AST SERPL-CCNC: 22 U/L (ref 12–45)
BILIRUB SERPL-MCNC: 0.6 MG/DL (ref 0.1–1.5)
BUN SERPL-MCNC: 11 MG/DL (ref 8–22)
CALCIUM SERPL-MCNC: 9.2 MG/DL (ref 8.5–10.5)
CHLORIDE SERPL-SCNC: 104 MMOL/L (ref 96–112)
CHOLEST SERPL-MCNC: 228 MG/DL (ref 100–199)
CO2 SERPL-SCNC: 26 MMOL/L (ref 20–33)
CREAT SERPL-MCNC: 0.82 MG/DL (ref 0.5–1.4)
FASTING STATUS PATIENT QL REPORTED: NORMAL
GLOBULIN SER CALC-MCNC: 2.7 G/DL (ref 1.9–3.5)
GLUCOSE SERPL-MCNC: 101 MG/DL (ref 65–99)
HDLC SERPL-MCNC: 61 MG/DL
LDLC SERPL CALC-MCNC: 141 MG/DL
POTASSIUM SERPL-SCNC: 4.2 MMOL/L (ref 3.6–5.5)
PROT SERPL-MCNC: 6.9 G/DL (ref 6–8.2)
SODIUM SERPL-SCNC: 140 MMOL/L (ref 135–145)
T4 FREE SERPL-MCNC: 1.4 NG/DL (ref 0.93–1.7)
TRIGL SERPL-MCNC: 131 MG/DL (ref 0–149)
TSH SERPL DL<=0.005 MIU/L-ACNC: 5.28 UIU/ML (ref 0.38–5.33)

## 2021-10-04 PROCEDURE — 36415 COLL VENOUS BLD VENIPUNCTURE: CPT

## 2021-10-04 PROCEDURE — 84443 ASSAY THYROID STIM HORMONE: CPT

## 2021-10-04 PROCEDURE — 80061 LIPID PANEL: CPT

## 2021-10-04 PROCEDURE — 80053 COMPREHEN METABOLIC PANEL: CPT

## 2021-10-04 PROCEDURE — 84439 ASSAY OF FREE THYROXINE: CPT

## 2021-10-04 PROCEDURE — 82306 VITAMIN D 25 HYDROXY: CPT

## 2021-10-11 ENCOUNTER — OFFICE VISIT (OUTPATIENT)
Dept: MEDICAL GROUP | Facility: PHYSICIAN GROUP | Age: 76
End: 2021-10-11
Payer: MEDICARE

## 2021-10-11 VITALS
DIASTOLIC BLOOD PRESSURE: 76 MMHG | BODY MASS INDEX: 23.16 KG/M2 | HEART RATE: 66 BPM | WEIGHT: 139 LBS | SYSTOLIC BLOOD PRESSURE: 122 MMHG | HEIGHT: 65 IN | TEMPERATURE: 97.5 F | OXYGEN SATURATION: 94 %

## 2021-10-11 DIAGNOSIS — E78.5 DYSLIPIDEMIA: ICD-10-CM

## 2021-10-11 DIAGNOSIS — K52.9 CHRONIC DIARRHEA: ICD-10-CM

## 2021-10-11 DIAGNOSIS — G25.0 BENIGN ESSENTIAL TREMOR: ICD-10-CM

## 2021-10-11 DIAGNOSIS — Z23 NEED FOR VACCINATION: ICD-10-CM

## 2021-10-11 DIAGNOSIS — E55.9 VITAMIN D DEFICIENCY: ICD-10-CM

## 2021-10-11 DIAGNOSIS — E03.9 HYPOTHYROIDISM, UNSPECIFIED TYPE: ICD-10-CM

## 2021-10-11 PROCEDURE — 90662 IIV NO PRSV INCREASED AG IM: CPT | Performed by: FAMILY MEDICINE

## 2021-10-11 PROCEDURE — 99214 OFFICE O/P EST MOD 30 MIN: CPT | Mod: 25 | Performed by: FAMILY MEDICINE

## 2021-10-11 PROCEDURE — G0008 ADMIN INFLUENZA VIRUS VAC: HCPCS | Performed by: FAMILY MEDICINE

## 2021-10-11 RX ORDER — DIPHENOXYLATE HYDROCHLORIDE AND ATROPINE SULFATE 2.5; .025 MG/1; MG/1
1 TABLET ORAL
Qty: 90 TABLET | Refills: 0 | Status: SHIPPED | OUTPATIENT
Start: 2021-10-11 | End: 2022-01-09

## 2021-10-11 RX ORDER — LEVOTHYROXINE SODIUM 0.05 MG/1
50 TABLET ORAL EVERY MORNING
Qty: 90 TABLET | Refills: 3 | Status: SHIPPED | OUTPATIENT
Start: 2021-10-11 | End: 2022-01-14 | Stop reason: SDUPTHER

## 2021-10-11 RX ORDER — PRAVASTATIN SODIUM 40 MG
40 TABLET ORAL DAILY
Qty: 90 TABLET | Refills: 3 | Status: SHIPPED | OUTPATIENT
Start: 2021-10-11 | End: 2022-10-25 | Stop reason: SDUPTHER

## 2021-10-11 ASSESSMENT — PATIENT HEALTH QUESTIONNAIRE - PHQ9: CLINICAL INTERPRETATION OF PHQ2 SCORE: 0

## 2021-10-11 ASSESSMENT — FIBROSIS 4 INDEX: FIB4 SCORE: 1.36

## 2021-10-11 NOTE — PROGRESS NOTES
"CC: Hypothyroidism, dyslipidemia    HISTORY OF THE PRESENT ILLNESS: Patient is a 76 y.o. female.     Patient is here today to follow-up on chronic health conditions including dyslipidemia, hypothyroidism, vitamin D deficiency and chronic diarrhea.    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  She feels that she is managing okay at this time.    Allergies: Bactrim [sulfamethoxazole w-trimethoprim]    Current Outpatient Medications Ordered in Epic   Medication Sig Dispense Refill   • diphenoxylate-atropine (LOMOTIL) 2.5-0.025 MG Tab Take 1 Tablet by mouth 1 time a day as needed for Diarrhea for up to 90 days. 90 Tablet 0   • pravastatin (PRAVACHOL) 40 MG tablet Take 1 Tablet by mouth every day. 90 Tablet 3   • levothyroxine (SYNTHROID) 50 MCG Tab Take 1 Tablet by mouth every morning. 90 Tablet 3     No current Epic-ordered facility-administered medications on file.       History reviewed. No pertinent past medical history.    History reviewed. No pertinent surgical history.    Social History     Tobacco Use   • Smoking status: Never Smoker   • Smokeless tobacco: Never Used   Vaping Use   • Vaping Use: Never used   Substance Use Topics   • Alcohol use: Yes     Comment: occ    • Drug use: No       Social History     Social History Narrative   • Not on file       Family History   Problem Relation Age of Onset   • No Known Problems Mother    • No Known Problems Father    • Arthritis Sister    • No Known Problems Maternal Grandmother    • No Known Problems Maternal Grandfather    • No Known Problems Sister        Labs: Labs reviewed from October 4, 2021.      Exam: /76 (BP Location: Left arm, Patient Position: Sitting, BP Cuff Size: Adult)   Pulse 66   Temp 36.4 °C (97.5 °F) (Temporal)   Ht 1.651 m (5' 5\")   Wt 63 kg (139 lb)   SpO2 94%  Body mass index is 23.13 kg/m².    General: Well appearing, NAD  HEENT: Normocephalic. " Conjunctiva clear, lids without ptosis, pupils equal and reactive to light accommodation, ears normal shape and contour, canals are clear bilaterally, tympanic membranes without bulging or erythema and normal cone of light  Neck: Supple without JVD. No thyromegaly or nodules  Pulmonary: Clear to ausculation.  Normal effort. No rales, ronchi, or wheezing.  Cardiovascular: Regular rate and rhythm without murmur, rubs or gallop.   Abdomen: Soft, nontender, nondistended. Normal bowel sounds. Liver and spleen are not palpable. No rebound or guarding  Neurologic: normal gait  Lymph: No cervical, supraclavicular  lymph nodes are palpable  Skin: Warm and dry.  No obvious lesions.  Musculoskeletal:  No extremity cyanosis, clubbing, or edema.  Psych: Normal mood and affect. Alert and oriented. Judgment and insight is normal.    Please note that this dictation was created using voice recognition software. I have made every reasonable attempt to correct obvious errors, but I expect that there are errors of grammar and possibly content that I did not discover before finalizing the note.      Assessment/Plan  Poppy was seen today for annual exam and other.    Diagnoses and all orders for this visit:    Need for vaccination  -     INFLUENZA VACCINE, HIGH DOSE (65+ ONLY)    Dyslipidemia  Chronic issue.  LDL remains at 141 with total cholesterol of 228, even on pravastatin.  Patient declines increasing dosage of pravastatin at this time.  She feels that she is not eating as healthy as she should and not getting enough exercise if she should,.  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  We will go ahead continue pravastatin at current dose for the time being.  -     pravastatin (PRAVACHOL) 40 MG tablet; Take 1 Tablet by mouth every day.    Hypothyroidism, unspecified type  Chronic issue, most recent lab work showing that TSH and T4 within normal limits.  We will continue levothyroxine at current dose.  Patient reports  good energy levels.  -     levothyroxine (SYNTHROID) 50 MCG Tab; Take 1 Tablet by mouth every morning.    Vitamin D deficiency  She does take a vitamin D supplement.  Vitamin D are in appropriate range on most recent lab work.  Continue current supplementation.    Chronic diarrhea  Patient notes that she intermittently has diarrhea.  She is up-to-date on colon cancer screening.  She would like a prescription from Lomotil to use as needed.  She notes that she maybe uses it twice per month, or less.  -     diphenoxylate-atropine (LOMOTIL) 2.5-0.025 MG Tab; Take 1 Tablet by mouth 1 time a day as needed for Diarrhea for up to 90 days.    Obtained and reviewed patient utilization report from [facility] pharmacy database on 10/11/2021 12:25 PM  prior to writing prescription for controlled substance II, III or IV per [place] bill . Based on assessment of the report,my physical exam if necessary and the patient's health problem, the prescription is medically necessary.     Benign essential tremor  Ongoing issue.  Initially diagnosed in 2018, per previous PCP.  Has been nonprogressive, seems more prominent in left hand.  She denies any other neurologic symptoms such as numbness, weakness tingling, muscle rigidity, changes in gait.  Tremors always action based and not a resting tremor.  Symptoms still consistent with benign essential tremor.  Recommend monitoring.  Certainly if she begins to have progressive tremor, worsening of symptoms or any other neurologic symptoms, she can let me know and we will refer to neurology.    Follow-up in 1 year sooner if needed.    Juliet Willis,   [facility]

## 2022-01-14 DIAGNOSIS — E03.9 HYPOTHYROIDISM, UNSPECIFIED TYPE: ICD-10-CM

## 2022-01-14 RX ORDER — LEVOTHYROXINE SODIUM 0.05 MG/1
50 TABLET ORAL EVERY MORNING
Qty: 90 TABLET | Refills: 3 | Status: SHIPPED | OUTPATIENT
Start: 2022-01-14 | End: 2022-03-07 | Stop reason: SDUPTHER

## 2022-03-07 DIAGNOSIS — E03.9 HYPOTHYROIDISM, UNSPECIFIED TYPE: ICD-10-CM

## 2022-03-07 NOTE — TELEPHONE ENCOUNTER
Received request via: Pharmacy    Was the patient seen in the last year in this department? Yes    Does the patient have an active prescription (recently filled or refills available) for medication(s) requested? No     Requested Prescriptions     Pending Prescriptions Disp Refills   • levothyroxine (SYNTHROID) 50 MCG Tab 90 Tablet 3     Sig: Take 1 Tablet by mouth every morning.

## 2022-03-08 RX ORDER — LEVOTHYROXINE SODIUM 0.05 MG/1
50 TABLET ORAL EVERY MORNING
Qty: 90 TABLET | Refills: 0 | Status: SHIPPED | OUTPATIENT
Start: 2022-03-08 | End: 2022-09-14

## 2022-10-25 ENCOUNTER — OFFICE VISIT (OUTPATIENT)
Dept: MEDICAL GROUP | Facility: PHYSICIAN GROUP | Age: 77
End: 2022-10-25
Payer: MEDICARE

## 2022-10-25 VITALS
SYSTOLIC BLOOD PRESSURE: 132 MMHG | BODY MASS INDEX: 23.15 KG/M2 | DIASTOLIC BLOOD PRESSURE: 84 MMHG | HEIGHT: 64 IN | WEIGHT: 135.6 LBS | OXYGEN SATURATION: 98 % | HEART RATE: 64 BPM | TEMPERATURE: 97.4 F

## 2022-10-25 DIAGNOSIS — E03.9 HYPOTHYROIDISM, UNSPECIFIED TYPE: ICD-10-CM

## 2022-10-25 DIAGNOSIS — F43.21 GRIEF: ICD-10-CM

## 2022-10-25 DIAGNOSIS — Z78.0 POSTMENOPAUSAL: ICD-10-CM

## 2022-10-25 DIAGNOSIS — G25.0 BENIGN ESSENTIAL TREMOR: ICD-10-CM

## 2022-10-25 DIAGNOSIS — E55.9 VITAMIN D DEFICIENCY: ICD-10-CM

## 2022-10-25 DIAGNOSIS — Z23 NEED FOR VACCINATION: ICD-10-CM

## 2022-10-25 DIAGNOSIS — K52.9 CHRONIC DIARRHEA: ICD-10-CM

## 2022-10-25 DIAGNOSIS — E78.5 DYSLIPIDEMIA: ICD-10-CM

## 2022-10-25 PROCEDURE — G0008 ADMIN INFLUENZA VIRUS VAC: HCPCS

## 2022-10-25 PROCEDURE — 90662 IIV NO PRSV INCREASED AG IM: CPT

## 2022-10-25 PROCEDURE — 99214 OFFICE O/P EST MOD 30 MIN: CPT | Mod: 25

## 2022-10-25 RX ORDER — PRAVASTATIN SODIUM 40 MG
40 TABLET ORAL DAILY
Qty: 100 TABLET | Refills: 3 | Status: SHIPPED | OUTPATIENT
Start: 2022-10-25 | End: 2023-10-27 | Stop reason: SDUPTHER

## 2022-10-25 RX ORDER — DIPHENOXYLATE HYDROCHLORIDE AND ATROPINE SULFATE 2.5; .025 MG/1; MG/1
1 TABLET ORAL 4 TIMES DAILY PRN
Qty: 90 TABLET | Refills: 0 | Status: SHIPPED | OUTPATIENT
Start: 2022-10-25 | End: 2023-10-24 | Stop reason: SDUPTHER

## 2022-10-25 RX ORDER — LEVOTHYROXINE SODIUM 0.05 MG/1
50 TABLET ORAL EVERY MORNING
Qty: 100 TABLET | Refills: 3 | Status: SHIPPED | OUTPATIENT
Start: 2022-10-25 | End: 2023-04-25 | Stop reason: SDUPTHER

## 2022-10-25 ASSESSMENT — ENCOUNTER SYMPTOMS
TREMORS: 1
COUGH: 0
SHORTNESS OF BREATH: 0
WEIGHT LOSS: 1
PALPITATIONS: 0

## 2022-10-25 ASSESSMENT — PATIENT HEALTH QUESTIONNAIRE - PHQ9
5. POOR APPETITE OR OVEREATING: 3 - NEARLY EVERY DAY
CLINICAL INTERPRETATION OF PHQ2 SCORE: 1
SUM OF ALL RESPONSES TO PHQ QUESTIONS 1-9: 5

## 2022-10-25 NOTE — ASSESSMENT & PLAN NOTE
Chronic, improving. Reports reduced symptoms since covid, as she is staying at home more often.   Will provide refill for lomotil for this, for as needed

## 2022-10-25 NOTE — ASSESSMENT & PLAN NOTE
Acute, stable.  Patient reports sadness but denies depression.  Lost her  of 33 years in September 2022.  Discussed returning for evaluation if feeling depressed.  Provided my condolences.

## 2022-10-25 NOTE — PROGRESS NOTES
"Subjective:     CC: Patient presents today to establish care.  Prior PCP Juliet Willis requesting refills on medications.  Does elicit that she recently lost her  of 33 years in 2022.    HPI:   Poppy presents today with    Problem   Grief   Chronic Diarrhea   Dyslipidemia   Hypothyroidism   Benign Essential Tremor     ROS:  Review of Systems   Constitutional:  Positive for weight loss (about 10 lbs since  .).   Respiratory:  Negative for cough and shortness of breath.    Cardiovascular:  Negative for chest pain and palpitations.   Neurological:  Positive for tremors (to hands, L>R).   All other systems reviewed and are negative.    Objective:     Exam:  /84 (BP Location: Left arm, Patient Position: Sitting, BP Cuff Size: Small adult)   Pulse 64   Temp 36.3 °C (97.4 °F) (Temporal)   Ht 1.62 m (5' 3.78\")   Wt 61.5 kg (135 lb 9.6 oz)   SpO2 98%   BMI 23.44 kg/m²  Body mass index is 23.44 kg/m².    Physical Exam  Vitals reviewed.   Constitutional:       Appearance: Normal appearance.   HENT:      Head: Normocephalic and atraumatic.      Right Ear: Tympanic membrane, ear canal and external ear normal.      Left Ear: Tympanic membrane, ear canal and external ear normal.   Eyes:      Extraocular Movements: Extraocular movements intact.      Conjunctiva/sclera: Conjunctivae normal.      Pupils: Pupils are equal, round, and reactive to light.   Cardiovascular:      Rate and Rhythm: Normal rate and regular rhythm.      Pulses: Normal pulses.      Heart sounds: Normal heart sounds. No murmur heard.  Pulmonary:      Effort: Pulmonary effort is normal. No respiratory distress.      Breath sounds: Normal breath sounds. No wheezing.   Abdominal:      General: Bowel sounds are normal.      Palpations: Abdomen is soft.   Musculoskeletal:         General: No swelling, tenderness or deformity. Normal range of motion.   Skin:     General: Skin is warm and dry.      Capillary Refill: " Capillary refill takes less than 2 seconds.   Neurological:      General: No focal deficit present.      Mental Status: She is alert and oriented to person, place, and time.      Cranial Nerves: No cranial nerve deficit.      Sensory: No sensory deficit.      Motor: No weakness.   Psychiatric:         Mood and Affect: Affect is flat and tearful.     Labs: Reviewed from 10/4/2021    Assessment & Plan:     77 y.o. female with the following -     Problem List Items Addressed This Visit       Dyslipidemia     Chronic, stable.  Continue pravastatin 40 mg daily         Relevant Medications    pravastatin (PRAVACHOL) 40 MG tablet    Other Relevant Orders    Lipid Profile    Hypothyroidism     Chronic, stable.  We will recheck labs.  Continue levothyroxine 50 mcg daily.         Relevant Medications    levothyroxine (SYNTHROID) 50 MCG Tab    Other Relevant Orders    TSH    Benign essential tremor     Chronic, stable, intermittent. Unchanging. Initially diagnosed in 2018, per previous PCP.  Has been nonprogressive, seems more prominent in left hand.  She denies any other neurologic symptoms such as numbness, weakness tingling, muscle rigidity, changes in gait.  Tremors always action based and not a resting tremor.  Symptoms still consistent with benign essential tremor.  Recommend monitoring.  Certainly if she begins to have progressive tremor, worsening of symptoms or any other neurologic symptoms, she can let me know and we will refer to neurology.         Chronic diarrhea     Chronic, improving. Reports reduced symptoms since covid, as she is staying at home more often.   Will provide refill for lomotil for this, for as needed         Relevant Medications    diphenoxylate-atropine (LOMOTIL) 2.5-0.025 MG Tab    Other Relevant Orders    Comp Metabolic Panel    CBC WITHOUT DIFFERENTIAL    Vitamin D deficiency    Grief     Acute, stable.  Patient reports sadness but denies depression.  Lost her  of 33 years in September  2022.  Discussed returning for evaluation if feeling depressed.  Provided my condolences.          Other Visit Diagnoses       Need for vaccination        Relevant Orders    INFLUENZA VACCINE, HIGH DOSE (65+ ONLY) (Completed)    Postmenopausal        Relevant Orders    DS-BONE DENSITY STUDY (DEXA)          I have placed the below orders and discussed them with an approved delegating provider.  The MA is performing the below orders under the direction of Dr. Tan.    I spent a total of 38 minutes with record review, exam, communication with the patient, communication with other providers, and documentation of this encounter.    Return in about 6 months (around 4/25/2023), or if symptoms worsen or fail to improve, for Medicare Wellness.    Please note that this dictation was created using voice recognition software. I have made every reasonable attempt to correct obvious errors, but I expect that there are errors of grammar and possibly content that I did not discover before finalizing the note.

## 2022-10-25 NOTE — ASSESSMENT & PLAN NOTE
Chronic, stable, intermittent. Unchanging. Initially diagnosed in 2018, per previous PCP.  Has been nonprogressive, seems more prominent in left hand.  She denies any other neurologic symptoms such as numbness, weakness tingling, muscle rigidity, changes in gait.  Tremors always action based and not a resting tremor.  Symptoms still consistent with benign essential tremor.  Recommend monitoring.  Certainly if she begins to have progressive tremor, worsening of symptoms or any other neurologic symptoms, she can let me know and we will refer to neurology.

## 2022-11-09 ENCOUNTER — PATIENT MESSAGE (OUTPATIENT)
Dept: HEALTH INFORMATION MANAGEMENT | Facility: OTHER | Age: 77
End: 2022-11-09

## 2022-12-06 ENCOUNTER — HOSPITAL ENCOUNTER (OUTPATIENT)
Dept: LAB | Facility: MEDICAL CENTER | Age: 77
End: 2022-12-06
Payer: MEDICARE

## 2022-12-06 DIAGNOSIS — E03.9 HYPOTHYROIDISM, UNSPECIFIED TYPE: ICD-10-CM

## 2022-12-06 DIAGNOSIS — K52.9 CHRONIC DIARRHEA: ICD-10-CM

## 2022-12-06 DIAGNOSIS — E78.5 DYSLIPIDEMIA: ICD-10-CM

## 2022-12-06 LAB
ALBUMIN SERPL BCP-MCNC: 4.4 G/DL (ref 3.2–4.9)
ALBUMIN/GLOB SERPL: 1.6 G/DL
ALP SERPL-CCNC: 77 U/L (ref 30–99)
ALT SERPL-CCNC: 16 U/L (ref 2–50)
ANION GAP SERPL CALC-SCNC: 13 MMOL/L (ref 7–16)
AST SERPL-CCNC: 23 U/L (ref 12–45)
BILIRUB SERPL-MCNC: 0.6 MG/DL (ref 0.1–1.5)
BUN SERPL-MCNC: 12 MG/DL (ref 8–22)
CALCIUM SERPL-MCNC: 9.5 MG/DL (ref 8.5–10.5)
CHLORIDE SERPL-SCNC: 103 MMOL/L (ref 96–112)
CHOLEST SERPL-MCNC: 220 MG/DL (ref 100–199)
CO2 SERPL-SCNC: 27 MMOL/L (ref 20–33)
CREAT SERPL-MCNC: 0.81 MG/DL (ref 0.5–1.4)
ERYTHROCYTE [DISTWIDTH] IN BLOOD BY AUTOMATED COUNT: 50.5 FL (ref 35.9–50)
FASTING STATUS PATIENT QL REPORTED: NORMAL
GFR SERPLBLD CREATININE-BSD FMLA CKD-EPI: 74 ML/MIN/1.73 M 2
GLOBULIN SER CALC-MCNC: 2.7 G/DL (ref 1.9–3.5)
GLUCOSE SERPL-MCNC: 89 MG/DL (ref 65–99)
HCT VFR BLD AUTO: 41.8 % (ref 37–47)
HDLC SERPL-MCNC: 69 MG/DL
HGB BLD-MCNC: 13.4 G/DL (ref 12–16)
LDLC SERPL CALC-MCNC: 126 MG/DL
MCH RBC QN AUTO: 31.1 PG (ref 27–33)
MCHC RBC AUTO-ENTMCNC: 32.1 G/DL (ref 33.6–35)
MCV RBC AUTO: 97 FL (ref 81.4–97.8)
PLATELET # BLD AUTO: 344 K/UL (ref 164–446)
PMV BLD AUTO: 11.4 FL (ref 9–12.9)
POTASSIUM SERPL-SCNC: 3.9 MMOL/L (ref 3.6–5.5)
PROT SERPL-MCNC: 7.1 G/DL (ref 6–8.2)
RBC # BLD AUTO: 4.31 M/UL (ref 4.2–5.4)
SODIUM SERPL-SCNC: 143 MMOL/L (ref 135–145)
TRIGL SERPL-MCNC: 123 MG/DL (ref 0–149)
TSH SERPL DL<=0.005 MIU/L-ACNC: 4.26 UIU/ML (ref 0.38–5.33)
WBC # BLD AUTO: 8.7 K/UL (ref 4.8–10.8)

## 2022-12-06 PROCEDURE — 85027 COMPLETE CBC AUTOMATED: CPT

## 2022-12-06 PROCEDURE — 36415 COLL VENOUS BLD VENIPUNCTURE: CPT

## 2022-12-06 PROCEDURE — 80061 LIPID PANEL: CPT

## 2022-12-06 PROCEDURE — 80053 COMPREHEN METABOLIC PANEL: CPT

## 2022-12-06 PROCEDURE — 84443 ASSAY THYROID STIM HORMONE: CPT

## 2023-03-21 ENCOUNTER — HOSPITAL ENCOUNTER (OUTPATIENT)
Dept: RADIOLOGY | Facility: MEDICAL CENTER | Age: 78
End: 2023-03-21
Payer: MEDICARE

## 2023-04-20 ENCOUNTER — HOSPITAL ENCOUNTER (OUTPATIENT)
Dept: RADIOLOGY | Facility: MEDICAL CENTER | Age: 78
End: 2023-04-20
Payer: MEDICARE

## 2023-04-20 DIAGNOSIS — Z78.0 POSTMENOPAUSAL: ICD-10-CM

## 2023-04-20 PROCEDURE — 77080 DXA BONE DENSITY AXIAL: CPT

## 2023-04-24 SDOH — ECONOMIC STABILITY: INCOME INSECURITY: HOW HARD IS IT FOR YOU TO PAY FOR THE VERY BASICS LIKE FOOD, HOUSING, MEDICAL CARE, AND HEATING?: NOT HARD AT ALL

## 2023-04-24 SDOH — HEALTH STABILITY: MENTAL HEALTH
STRESS IS WHEN SOMEONE FEELS TENSE, NERVOUS, ANXIOUS, OR CAN'T SLEEP AT NIGHT BECAUSE THEIR MIND IS TROUBLED. HOW STRESSED ARE YOU?: ONLY A LITTLE

## 2023-04-24 SDOH — ECONOMIC STABILITY: TRANSPORTATION INSECURITY
IN THE PAST 12 MONTHS, HAS THE LACK OF TRANSPORTATION KEPT YOU FROM MEDICAL APPOINTMENTS OR FROM GETTING MEDICATIONS?: NO

## 2023-04-24 SDOH — ECONOMIC STABILITY: FOOD INSECURITY: WITHIN THE PAST 12 MONTHS, THE FOOD YOU BOUGHT JUST DIDN'T LAST AND YOU DIDN'T HAVE MONEY TO GET MORE.: NEVER TRUE

## 2023-04-24 SDOH — ECONOMIC STABILITY: FOOD INSECURITY: WITHIN THE PAST 12 MONTHS, YOU WORRIED THAT YOUR FOOD WOULD RUN OUT BEFORE YOU GOT MONEY TO BUY MORE.: NEVER TRUE

## 2023-04-24 SDOH — ECONOMIC STABILITY: TRANSPORTATION INSECURITY
IN THE PAST 12 MONTHS, HAS LACK OF TRANSPORTATION KEPT YOU FROM MEETINGS, WORK, OR FROM GETTING THINGS NEEDED FOR DAILY LIVING?: NO

## 2023-04-24 SDOH — ECONOMIC STABILITY: HOUSING INSECURITY: IN THE LAST 12 MONTHS, HOW MANY PLACES HAVE YOU LIVED?: 1

## 2023-04-24 SDOH — ECONOMIC STABILITY: HOUSING INSECURITY
IN THE LAST 12 MONTHS, WAS THERE A TIME WHEN YOU DID NOT HAVE A STEADY PLACE TO SLEEP OR SLEPT IN A SHELTER (INCLUDING NOW)?: NO

## 2023-04-24 SDOH — ECONOMIC STABILITY: INCOME INSECURITY: IN THE LAST 12 MONTHS, WAS THERE A TIME WHEN YOU WERE NOT ABLE TO PAY THE MORTGAGE OR RENT ON TIME?: NO

## 2023-04-24 SDOH — HEALTH STABILITY: PHYSICAL HEALTH: ON AVERAGE, HOW MANY MINUTES DO YOU ENGAGE IN EXERCISE AT THIS LEVEL?: 30 MIN

## 2023-04-24 SDOH — HEALTH STABILITY: PHYSICAL HEALTH: ON AVERAGE, HOW MANY DAYS PER WEEK DO YOU ENGAGE IN MODERATE TO STRENUOUS EXERCISE (LIKE A BRISK WALK)?: 5 DAYS

## 2023-04-24 SDOH — ECONOMIC STABILITY: TRANSPORTATION INSECURITY
IN THE PAST 12 MONTHS, HAS LACK OF RELIABLE TRANSPORTATION KEPT YOU FROM MEDICAL APPOINTMENTS, MEETINGS, WORK OR FROM GETTING THINGS NEEDED FOR DAILY LIVING?: NO

## 2023-04-24 ASSESSMENT — SOCIAL DETERMINANTS OF HEALTH (SDOH)
HOW OFTEN DO YOU ATTEND CHURCH OR RELIGIOUS SERVICES?: NEVER
HOW OFTEN DO YOU GET TOGETHER WITH FRIENDS OR RELATIVES?: MORE THAN THREE TIMES A WEEK
HOW OFTEN DO YOU HAVE SIX OR MORE DRINKS ON ONE OCCASION: NEVER
HOW HARD IS IT FOR YOU TO PAY FOR THE VERY BASICS LIKE FOOD, HOUSING, MEDICAL CARE, AND HEATING?: NOT HARD AT ALL
DO YOU BELONG TO ANY CLUBS OR ORGANIZATIONS SUCH AS CHURCH GROUPS UNIONS, FRATERNAL OR ATHLETIC GROUPS, OR SCHOOL GROUPS?: YES
HOW MANY DRINKS CONTAINING ALCOHOL DO YOU HAVE ON A TYPICAL DAY WHEN YOU ARE DRINKING: 1 OR 2
HOW OFTEN DO YOU ATTENT MEETINGS OF THE CLUB OR ORGANIZATION YOU BELONG TO?: MORE THAN 4 TIMES PER YEAR
HOW OFTEN DO YOU ATTENT MEETINGS OF THE CLUB OR ORGANIZATION YOU BELONG TO?: MORE THAN 4 TIMES PER YEAR
WITHIN THE PAST 12 MONTHS, YOU WORRIED THAT YOUR FOOD WOULD RUN OUT BEFORE YOU GOT THE MONEY TO BUY MORE: NEVER TRUE
HOW OFTEN DO YOU GET TOGETHER WITH FRIENDS OR RELATIVES?: MORE THAN THREE TIMES A WEEK
IN A TYPICAL WEEK, HOW MANY TIMES DO YOU TALK ON THE PHONE WITH FAMILY, FRIENDS, OR NEIGHBORS?: PATIENT DECLINED
IN A TYPICAL WEEK, HOW MANY TIMES DO YOU TALK ON THE PHONE WITH FAMILY, FRIENDS, OR NEIGHBORS?: PATIENT DECLINED
DO YOU BELONG TO ANY CLUBS OR ORGANIZATIONS SUCH AS CHURCH GROUPS UNIONS, FRATERNAL OR ATHLETIC GROUPS, OR SCHOOL GROUPS?: YES
HOW OFTEN DO YOU HAVE A DRINK CONTAINING ALCOHOL: 2-4 TIMES A MONTH
HOW OFTEN DO YOU ATTEND CHURCH OR RELIGIOUS SERVICES?: NEVER

## 2023-04-24 ASSESSMENT — LIFESTYLE VARIABLES
AUDIT-C TOTAL SCORE: 2
HOW MANY STANDARD DRINKS CONTAINING ALCOHOL DO YOU HAVE ON A TYPICAL DAY: 1 OR 2
HOW OFTEN DO YOU HAVE A DRINK CONTAINING ALCOHOL: 2-4 TIMES A MONTH
SKIP TO QUESTIONS 9-10: 1
HOW OFTEN DO YOU HAVE SIX OR MORE DRINKS ON ONE OCCASION: NEVER

## 2023-04-25 ENCOUNTER — OFFICE VISIT (OUTPATIENT)
Dept: MEDICAL GROUP | Facility: PHYSICIAN GROUP | Age: 78
End: 2023-04-25
Payer: MEDICARE

## 2023-04-25 VITALS
RESPIRATION RATE: 16 BRPM | WEIGHT: 135 LBS | BODY MASS INDEX: 23.05 KG/M2 | OXYGEN SATURATION: 97 % | SYSTOLIC BLOOD PRESSURE: 114 MMHG | HEART RATE: 82 BPM | DIASTOLIC BLOOD PRESSURE: 78 MMHG | HEIGHT: 64 IN | TEMPERATURE: 97.3 F

## 2023-04-25 DIAGNOSIS — G44.209 TENSION HEADACHE: ICD-10-CM

## 2023-04-25 DIAGNOSIS — M85.852 OSTEOPENIA OF LEFT HIP: ICD-10-CM

## 2023-04-25 DIAGNOSIS — E03.9 HYPOTHYROIDISM, UNSPECIFIED TYPE: ICD-10-CM

## 2023-04-25 DIAGNOSIS — H91.93 DECREASED HEARING OF BOTH EARS: ICD-10-CM

## 2023-04-25 DIAGNOSIS — Z11.59 NEED FOR HEPATITIS C SCREENING TEST: ICD-10-CM

## 2023-04-25 DIAGNOSIS — F43.21 GRIEF: ICD-10-CM

## 2023-04-25 PROBLEM — M85.859 OSTEOPENIA OF HIP: Status: ACTIVE | Noted: 2023-04-25

## 2023-04-25 PROCEDURE — G0439 PPPS, SUBSEQ VISIT: HCPCS

## 2023-04-25 RX ORDER — LEVOTHYROXINE SODIUM 0.05 MG/1
50 TABLET ORAL EVERY MORNING
Qty: 90 TABLET | Refills: 3 | Status: SHIPPED | OUTPATIENT
Start: 2023-04-25 | End: 2023-10-27 | Stop reason: SDUPTHER

## 2023-04-25 ASSESSMENT — ACTIVITIES OF DAILY LIVING (ADL): BATHING_REQUIRES_ASSISTANCE: 0

## 2023-04-25 ASSESSMENT — FIBROSIS 4 INDEX: FIB4 SCORE: 1.3

## 2023-04-25 ASSESSMENT — PATIENT HEALTH QUESTIONNAIRE - PHQ9: CLINICAL INTERPRETATION OF PHQ2 SCORE: 0

## 2023-04-25 ASSESSMENT — ENCOUNTER SYMPTOMS: GENERAL WELL-BEING: GOOD

## 2023-04-25 NOTE — ASSESSMENT & PLAN NOTE
Chronic, stable.  Intensity 4-5/10 resolves with Excedrin tension headache as needed. 3-6 headaches weekly.  Endorses eating enough, drinking enough.

## 2023-04-25 NOTE — PROGRESS NOTES
Chief Complaint   Patient presents with    Annual Exam       HPI:  Poppy Puente is a 77 y.o. here for Medicare Annual Wellness Visit     Patient Active Problem List    Diagnosis Date Noted    Decreased hearing of both ears 04/25/2023    Tension headache 04/25/2023    Osteopenia of hip 04/25/2023    Grief 10/25/2022    Vitamin D deficiency 10/05/2020    Visual disturbance 10/05/2020    Chronic diarrhea 03/15/2019    Dyslipidemia 07/18/2018    Hypothyroidism 07/18/2018    Benign essential tremor 07/18/2018       Current Outpatient Medications   Medication Sig Dispense Refill    levothyroxine (SYNTHROID) 50 MCG Tab Take 1 Tablet by mouth every morning. ON A EMPTY STOMACH 90 Tablet 3    Acetaminophen-Caffeine (EXCEDRIN TENSION HEADACHE PO) Take  by mouth.      pravastatin (PRAVACHOL) 40 MG tablet Take 1 Tablet by mouth every day. 100 Tablet 3     No current facility-administered medications for this visit.          Current supplements as per medication list.     Allergies: Bactrim [sulfamethoxazole w-trimethoprim]    Current social contact/activities: Poppy has been seeing her son and grandson mainly, she states ever since her  passed away in September she hasn't been as active. She also belongs to a group named [employer], through the [email or web address].      She  reports that she has never smoked. She has never used smokeless tobacco. She reports current alcohol use. She reports that she does not use drugs.  Counseling given: Not Answered      ROS:    Gait: Uses no assistive device  Ostomy: No  Other tubes: No  Amputations: No  Chronic oxygen use: No  Last eye exam: Probably 2 years ago.  Wears hearing aids: No   : Denies any urinary leakage during the last 6 months    Screening:    Depression Screening  Little interest or pleasure in doing things?  0 - not at all  Feeling down, depressed , or hopeless? 0 - not at all  Patient Health Questionnaire Score: 0     If depressive symptoms identified deferred to  follow up visit unless specifically addressed in assessment and plan.    Interpretation of PHQ-9 Total Score   Score Severity   1-4 No Depression   5-9 Mild Depression   10-14 Moderate Depression   15-19 Moderately Severe Depression   20-27 Severe Depression    Screening for Cognitive Impairment  Three Minute Recall (daughter, heaven, mountain) 2/3 Forgot chair.   Rocky clock face with all 12 numbers and set the hands to show 10 past 11.       Cognitive concerns identified deferred for follow up unless specifically addressed in assessment and plan.    Fall Risk Assessment  Has the patient had two or more falls in the last year or any fall with injury in the last year?  No    Safety Assessment  Throw rugs on floor.  No  Handrails on all stairs.  No  Good lighting in all hallways.  Yes  Difficulty hearing.  Yes  Patient counseled about all safety risks that were identified.    Functional Assessment ADLs  Are there any barriers preventing you from cooking for yourself or meeting nutritional needs?  No.    Are there any barriers preventing you from driving safely or obtaining transportation?  No.    Are there any barriers preventing you from using a telephone or calling for help?  No.    Are there any barriers preventing you from shopping?  No.    Are there any barriers preventing you from taking care of your own finances?  No.    Are there any barriers preventing you from managing your medications?  No.    Are there any barriers preventing you from showering, bathing or dressing yourself?  No.    Are you currently engaging in any exercise or physical activity?  Yes.  Patient tries to walk but hasn't been able to due to bad weather. Tries to go walking few times a week for about 30 minutes when there is nice weather.  What is your perception of your health?  Good    Advance Care Planning  Do you have an Advance Directive, Living Will, Durable Power of , or POLST? Yes    Living Will     is not on file - instructed  patient to bring in a copy to scan into their chart      Health Maintenance Summary            Ordered - HEPATITIS C SCREENING (Once) Ordered on 4/25/2023      No completion history exists for this topic.              Postponed - IMM ZOSTER VACCINES (1 of 2) Postponed until 9/25/2023      No completion history exists for this topic.              Postponed - COVID-19 Vaccine (3 - Booster for Moderna series) Postponed until 4/25/2024 11/25/2022  Imm Admin: MODERNA BIVALENT BOOSTER SARS-COV-2 VACCINE (6+)    02/14/2021  Imm Admin: MODERNA SARS-COV-2 VACCINE (12+)    01/17/2021  Imm Admin: MODERNA SARS-COV-2 VACCINE (12+)              Annual Wellness Visit (Every 366 Days) Next due on 4/25/2024 04/25/2023  Done    10/05/2020  Level of Service: [place] ANNUAL WELLNESS VISIT-INCLUDES PPPS SUBSEQUE*    10/05/2020  Visit Dx: Medicare annual wellness visit, subsequent    09/24/2019  Initial Annual Wellness Visit - Includes PPPS ()              BONE DENSITY (Every 5 Years) Next due on 4/20/2028 04/20/2023  DS-BONE DENSITY STUDY (DEXA)    11/10/2015  DS-BONE DENSITY STUDY (DEXA)              IMM DTaP/Tdap/Td Vaccine (2 - Td or Tdap) Next due on 9/24/2029 09/24/2019  Imm Admin: Tdap Vaccine              IMM PNEUMOCOCCAL VACCINE: 65+ Years (Series Information) Completed      10/05/2020  Imm Admin: Pneumococcal polysaccharide vaccine (PPSV-23)    09/24/2019  Imm Admin: Pneumococcal Conjugate Vaccine (Prevnar/PCV-13)              IMM INFLUENZA (Series Information) Completed      10/25/2022  Imm Admin: Influenza Vaccine Adult HD    10/11/2021  Imm Admin: Influenza Vaccine Adult HD    10/05/2020  Imm Admin: Influenza Vaccine Adult HD              IMM MENINGOCOCCAL ACWY VACCINE (Series Information) Aged Out      No completion history exists for this topic.              Discontinued - MAMMOGRAM  Discontinued        Frequency changed to Never automatically (Topic No Longer Applies)    08/09/2018  MA-MAMMO SCREENING  "BILSIS W/ELIAS W/CAD    11/10/2015  [id number]              Discontinued - COLORECTAL CANCER SCREENING  Discontinued        Frequency changed to Never automatically (Topic No Longer Applies)    03/16/2020  REFERRAL TO GI FOR COLONOSCOPY    10/09/2019  COLOGUARD COLON CANCER SCREENING    03/20/2019  OCCULT BLOOD FECES IMMUNOASSAY (FIT)    07/24/2018  OCCULT BLOOD FECES IMMUNOASSAY (FIT)              Discontinued - IMM HEP B VACCINE  Discontinued      No completion history exists for this topic.                    Patient Care Team:  MARKELL Sethi as PCP - General (Nurse Practitioner Family)  Janel Carlson O.D. as Consulting Physician (Optometry)  Isabella Brannon MD  as Consulting Physician (Family Medicine)        Social History     Tobacco Use    Smoking status: Never    Smokeless tobacco: Never   Vaping Use    Vaping Use: Never used   Substance Use Topics    Alcohol use: Yes     Comment: occ     Drug use: No     Family History   Problem Relation Age of Onset    No Known Problems Mother     No Known Problems Father     Arthritis Sister     No Known Problems Maternal Grandmother     No Known Problems Maternal Grandfather     No Known Problems Sister      She  has no past medical history on file.   History reviewed. No pertinent surgical history.    Exam:   /78 (BP Location: Left arm, Patient Position: Sitting, BP Cuff Size: Small adult)   Pulse 82   Temp 36.3 °C (97.3 °F) (Temporal)   Resp 16   Ht 1.62 m (5' 3.78\")   Wt 61.2 kg (135 lb)   SpO2 97%  Body mass index is 23.33 kg/m².    Hearing: Would like further evaluation. Referral placed  Dentition good  Alert, oriented in no acute distress.  Eye contact is good, speech goal directed, affect calm    Assessment and Plan. The following treatment and monitoring plan is recommended:  referral to audiology for hearing screening.  Problem List Items Addressed This Visit       Hypothyroidism     Chronic, stable.  Continue " levothyroxine 50 mcg daily.         Relevant Medications    levothyroxine (SYNTHROID) 50 MCG Tab    Grief     Chronic, improving.  She is making efforts to become more involved and get involved with different groups, socializing.  joined book club  14 year old cat with DM who keeps her busy with injections twice daily  taking classes (EPIC lectures)[facility] and part of club: [employer] at [employer]          Decreased hearing of both ears     Chronic, unstable.  Referral placed for audiology for further evaluation         Relevant Orders    Referral to Audiology    Tension headache     Chronic, stable.  Intensity 4-5/10 resolves with Excedrin tension headache as needed. 3-6 headaches weekly.  Endorses eating enough, drinking enough.         Relevant Medications    Acetaminophen-Caffeine (EXCEDRIN TENSION HEADACHE PO)    Osteopenia of hip     Chronic, stable. Lumbar spine T score: -1.6, Left femur -1.5, 4% 10 year hip probability of fracture  Discussed recommendation for bisphosphonate to reduce risk for fracture.  Patient declines today.          Other Visit Diagnoses       Need for hepatitis C screening test        Relevant Orders    HCV Scrn ( [id number] 1xLife - Medicare Patients Only)          Services suggested: No services needed at this time  Health Care Screening: Age-appropriate preventive services recommended by USPTF and ACIP covered by Medicare were discussed today. Services ordered if indicated and agreed upon by the patient.  Referrals offered: Community-based lifestyle interventions to reduce health risks and promote self-management and wellness, fall prevention, nutrition, physical activity, tobacco-use cessation, weight loss, and mental health services as per orders if indicated.    Discussion today about general wellness and lifestyle habits:    Prevent falls and reduce trip hazards; Cautioned about securing or removing rugs.  Have a working fire alarm and carbon monoxide detector;   Engage in regular physical  activity and social activities     Follow-up: Return in about 6 months (around 10/25/2023).    I have placed the below orders and discussed them with an approved delegating provider.  The MA is performing the below orders under the direction of Dr. Pitts.

## 2023-04-25 NOTE — ASSESSMENT & PLAN NOTE
Chronic, stable. Lumbar spine T score: -1.6, Left femur -1.5, 4% 10 year hip probability of fracture  Discussed recommendation for bisphosphonate to reduce risk for fracture.  Patient declines today.

## 2023-04-25 NOTE — ASSESSMENT & PLAN NOTE
Chronic, improving.  She is making efforts to become more involved and get involved with different groups, socializing.  joined book club  14 year old cat with DM who keeps her busy with injections twice daily  taking classes (EPIC lectures)[employer] and part of club: [facility]aura at [facility]

## 2023-10-24 ENCOUNTER — OFFICE VISIT (OUTPATIENT)
Dept: MEDICAL GROUP | Facility: PHYSICIAN GROUP | Age: 78
End: 2023-10-24
Payer: MEDICARE

## 2023-10-24 ENCOUNTER — HOSPITAL ENCOUNTER (OUTPATIENT)
Facility: MEDICAL CENTER | Age: 78
End: 2023-10-24
Payer: MEDICARE

## 2023-10-24 VITALS
WEIGHT: 135 LBS | HEART RATE: 71 BPM | HEIGHT: 64 IN | BODY MASS INDEX: 23.05 KG/M2 | RESPIRATION RATE: 16 BRPM | OXYGEN SATURATION: 94 % | TEMPERATURE: 97.1 F | DIASTOLIC BLOOD PRESSURE: 76 MMHG | SYSTOLIC BLOOD PRESSURE: 124 MMHG

## 2023-10-24 DIAGNOSIS — E03.9 HYPOTHYROIDISM, UNSPECIFIED TYPE: ICD-10-CM

## 2023-10-24 DIAGNOSIS — G44.209 TENSION HEADACHE: ICD-10-CM

## 2023-10-24 DIAGNOSIS — H90.5: ICD-10-CM

## 2023-10-24 DIAGNOSIS — Z91.89 10 YEAR RISK OF MI OR STROKE 7.5% OR GREATER: ICD-10-CM

## 2023-10-24 DIAGNOSIS — K52.9 CHRONIC DIARRHEA: ICD-10-CM

## 2023-10-24 DIAGNOSIS — R73.9 BLOOD GLUCOSE ELEVATED: ICD-10-CM

## 2023-10-24 DIAGNOSIS — Z79.899 HIGH RISK MEDICATION USE: ICD-10-CM

## 2023-10-24 DIAGNOSIS — G89.29 CHRONIC BILATERAL LOW BACK PAIN WITHOUT SCIATICA: ICD-10-CM

## 2023-10-24 DIAGNOSIS — M54.50 CHRONIC BILATERAL LOW BACK PAIN WITHOUT SCIATICA: ICD-10-CM

## 2023-10-24 DIAGNOSIS — E78.5 DYSLIPIDEMIA: ICD-10-CM

## 2023-10-24 PROCEDURE — 99214 OFFICE O/P EST MOD 30 MIN: CPT

## 2023-10-24 PROCEDURE — G0481 DRUG TEST DEF 8-14 CLASSES: HCPCS

## 2023-10-24 PROCEDURE — 3074F SYST BP LT 130 MM HG: CPT

## 2023-10-24 PROCEDURE — 3078F DIAST BP <80 MM HG: CPT

## 2023-10-24 RX ORDER — DIPHENOXYLATE HYDROCHLORIDE AND ATROPINE SULFATE 2.5; .025 MG/1; MG/1
1 TABLET ORAL 4 TIMES DAILY PRN
Qty: 90 TABLET | Refills: 0 | Status: SHIPPED | OUTPATIENT
Start: 2023-10-24 | End: 2023-11-23

## 2023-10-24 ASSESSMENT — FIBROSIS 4 INDEX: FIB4 SCORE: 1.32

## 2023-10-24 ASSESSMENT — ENCOUNTER SYMPTOMS
EYE REDNESS: 1
BACK PAIN: 1
HEADACHES: 1
DIARRHEA: 1

## 2023-10-24 NOTE — ASSESSMENT & PLAN NOTE
Chronic, ongoing. Reports ongoing intermittent diarrhea, for which she takes lomotil or imodium for. Reports lomotil causes less constipation after taking than imodium does.   Continue Lomotil as needed

## 2023-10-24 NOTE — PROGRESS NOTES
"Subjective:     CC: Diagnoses of Dyslipidemia, 10 year risk of MI or stroke 7.5% or greater, Tension headache, Hypothyroidism, unspecified type, Chronic diarrhea, Severe sensorineural hearing loss, Chronic bilateral low back pain without sciatica, Blood glucose elevated, and High risk medication use were pertinent to this visit.    HPI:   Poppy presents today with    Problem   10 Year Risk of MI Or Stroke 7.5% Or Greater   Chronic Bilateral Low Back Pain Without Sciatica    Chronic, unstable.  Reports intermittent bilateral low back pain, with intensity 5-6/10. Lasting a few hours to 1 day. Relieved with Excedrin, and with having bowel movement. Reports increased incidence when constipated. Reports she only gets constipated when she takes medication for her chronic diarrhea.     Severe Sensorineural Hearing Loss   Tension Headache   Chronic Diarrhea    Uds/cs: 10/24/23     Dyslipidemia   Hypothyroidism       ROS:  Review of Systems   Eyes:  Positive for redness.   Gastrointestinal:  Positive for diarrhea.   Musculoskeletal:  Positive for back pain.   Neurological:  Positive for headaches.   All other systems reviewed and are negative.      Objective:     Exam:  /76 (BP Location: Right arm, Patient Position: Sitting, BP Cuff Size: Small adult)   Pulse 71   Temp 36.2 °C (97.1 °F) (Temporal)   Resp 16   Ht 1.62 m (5' 3.78\")   Wt 61.2 kg (135 lb)   SpO2 94%   BMI 23.33 kg/m²  Body mass index is 23.33 kg/m².    Physical Exam  Vitals reviewed.   Constitutional:       General: She is not in acute distress.     Appearance: Normal appearance. She is not ill-appearing.   HENT:      Head: Normocephalic and atraumatic.   Cardiovascular:      Rate and Rhythm: Normal rate and regular rhythm.      Pulses: Normal pulses.      Heart sounds: Normal heart sounds.   Pulmonary:      Effort: Pulmonary effort is normal. No respiratory distress.      Breath sounds: Normal breath sounds.   Abdominal:      General: Bowel " sounds are normal.   Skin:     General: Skin is warm and dry.      Findings: No rash.   Neurological:      General: No focal deficit present.      Mental Status: She is alert and oriented to person, place, and time.   Psychiatric:         Mood and Affect: Mood normal.         Behavior: Behavior normal.       Assessment & Plan:     78 y.o. female with the following -     Problem List Items Addressed This Visit       Dyslipidemia     Chronic, stable.  Continue pravastatin 40 mg daily.  The 10-year ASCVD risk score (Evita PATEL, et al., 2019) is: 23.5%           Relevant Orders    Lipid Profile    Hypothyroidism     Chronic, stable. Continue synthroid 50 mcg daily.         Relevant Orders    TSH    Chronic diarrhea     Chronic, ongoing. Reports ongoing intermittent diarrhea, for which she takes lomotil or imodium for. Reports lomotil causes less constipation after taking than imodium does.   Continue Lomotil as needed         Relevant Medications    diphenoxylate-atropine (LOMOTIL) 2.5-0.025 MG Tab    Severe sensorineural hearing loss     Chronic, has been evaluated by audiology 10/10/23 (in media), recommended for bilateral hearing aids, follow up appointment scheduled, needs updated referral         Relevant Orders    Referral to Audiology    Tension headache     Chronic, ongoing. Reports primarily on the left side of her head. Wakes up with headaches. Associated with dizziness occasionally. Reports alleviated by Excedrin tension headache as needed.         10 year risk of MI or stroke 7.5% or greater     Chronic, unstable. Discussed healthy lifestyle recommendations.   continue pravastatin 40 mg daily  Recommend starting ASA 81 mg daily         Chronic bilateral low back pain without sciatica     Other Visit Diagnoses       Blood glucose elevated        Relevant Orders    HEMOGLOBIN A1C    Comp Metabolic Panel    High risk medication use        Relevant Orders    Controlled Substance Treatment Agreement    PAIN  MANAGEMENT SCRN, UR          Patient was educated in proper administration of medication(s) ordered today including safety, possible SE, risks, benefits, rationale and alternatives to therapy.   Supportive care, differential diagnoses, and indications for immediate follow-up discussed with patient.    Pathogenesis of diagnosis discussed including typical length and natural progression.    Instructed to return to clinic or nearest emergency department for any change in condition, further concerns, or worsening of symptoms.  Patient states understanding of the plan of care and discharge instructions.    Return in about 6 months (around 4/24/2024) for Labs.    Please note that this dictation was created using voice recognition software. I have made every reasonable attempt to correct obvious errors, but I expect that there are errors of grammar and possibly content that I did not discover before finalizing the note.

## 2023-10-24 NOTE — ASSESSMENT & PLAN NOTE
Chronic, stable.  Continue pravastatin 40 mg daily.  The 10-year ASCVD risk score (Evita PATEL, et al., 2019) is: 23.5%     [id number]

## 2023-10-24 NOTE — ASSESSMENT & PLAN NOTE
Chronic, has been evaluated by audiology 10/10/23 (in media), recommended for bilateral hearing aids, follow up appointment scheduled, needs updated referral

## 2023-10-24 NOTE — ASSESSMENT & PLAN NOTE
Chronic, unstable. Discussed healthy lifestyle recommendations.   continue pravastatin 40 mg daily  Recommend starting ASA 81 mg daily

## 2023-10-24 NOTE — ASSESSMENT & PLAN NOTE
Chronic, ongoing. Reports primarily on the left side of her head. Wakes up with headaches. Associated with dizziness occasionally. Reports alleviated by Excedrin tension headache as needed.

## 2023-10-29 LAB
1OH-MIDAZOLAM UR QL SCN: NOT DETECTED
6MAM UR QL: NOT DETECTED
7AMINOCLONAZEPAM UR QL: NOT DETECTED
A-OH ALPRAZ UR QL: NOT DETECTED
ALPRAZ UR QL: NOT DETECTED
AMPHET UR QL SCN: NOT DETECTED
ANNOTATION COMMENT IMP: NORMAL
ANNOTATION COMMENT IMP: NORMAL
BARBITURATES UR QL: NOT DETECTED
BUPRENORPHINE UR QL: NOT DETECTED
BZE UR QL: NOT DETECTED
CARBOXYTHC UR QL: NOT DETECTED
CARISOPRODOL UR QL: NOT DETECTED
CLONAZEPAM UR QL: NOT DETECTED
CODEINE UR QL: NOT DETECTED
DIAZEPAM UR QL: NOT DETECTED
ETHYL GLUCURONIDE UR QL: NOT DETECTED
FENTANYL UR QL: NOT DETECTED
GABAPENTIN UR QL: NOT DETECTED
HYDROCODONE UR QL: NOT DETECTED
HYDROMORPHONE UR QL: NOT DETECTED
LORAZEPAM UR QL: NOT DETECTED
MDA UR QL: NOT DETECTED
MDEA UR QL: NOT DETECTED
MDMA UR QL: NOT DETECTED
MEPERIDINE UR QL: NOT DETECTED
METHADONE UR QL: NOT DETECTED
METHAMPHET UR QL: NOT DETECTED
MIDAZOLAM UR QL SCN: NOT DETECTED
MORPHINE UR QL: NOT DETECTED
NALOXONE UR QL SCN: NOT DETECTED
NORBUPRENORPHINE UR QL CFM: NOT DETECTED
NORDIAZEPAM UR QL: NOT DETECTED
NORFENTANYL UR QL: NOT DETECTED
NORHYDROCODONE UR QL CFM: NOT DETECTED
NOROXYCODONE UR QL CFM: NOT DETECTED
NOROXYMORPH CO100 Q0458: NOT DETECTED
OXAZEPAM UR QL: NOT DETECTED
OXYCODONE UR QL: NOT DETECTED
OXYMORPHONE UR QL: NOT DETECTED
PATHOLOGY STUDY: NORMAL
PCP UR QL: NOT DETECTED
PHENTERMINE UR QL: NOT DETECTED
PPAA UR QL: NOT DETECTED
PREGABALIN UR QL SCN: NOT DETECTED
SERVICE CMNT-IMP: NORMAL
TAPENADOL OSULF CO200 Q0473: NOT DETECTED
TAPENTADOL UR QL SCN: NOT DETECTED
TEMAZEPAM UR QL: NOT DETECTED
TRAMADOL UR QL: NOT DETECTED
ZOLPIDEM PHENYL-4-CARB UR QL SCN: NOT DETECTED
ZOLPIDEM UR QL: NOT DETECTED

## 2024-03-19 ENCOUNTER — HOSPITAL ENCOUNTER (OUTPATIENT)
Dept: LAB | Facility: MEDICAL CENTER | Age: 79
End: 2024-03-19
Payer: MEDICARE

## 2024-03-19 DIAGNOSIS — E03.9 HYPOTHYROIDISM, UNSPECIFIED TYPE: ICD-10-CM

## 2024-03-19 DIAGNOSIS — R73.9 BLOOD GLUCOSE ELEVATED: ICD-10-CM

## 2024-03-19 DIAGNOSIS — E78.5 DYSLIPIDEMIA: ICD-10-CM

## 2024-03-19 DIAGNOSIS — Z11.59 NEED FOR HEPATITIS C SCREENING TEST: ICD-10-CM

## 2024-03-19 LAB
ALBUMIN SERPL BCP-MCNC: 4.4 G/DL (ref 3.2–4.9)
ALBUMIN/GLOB SERPL: 1.9 G/DL
ALP SERPL-CCNC: 72 U/L (ref 30–99)
ALT SERPL-CCNC: 15 U/L (ref 2–50)
ANION GAP SERPL CALC-SCNC: 14 MMOL/L (ref 7–16)
AST SERPL-CCNC: 25 U/L (ref 12–45)
BILIRUB SERPL-MCNC: 0.6 MG/DL (ref 0.1–1.5)
BUN SERPL-MCNC: 9 MG/DL (ref 8–22)
CALCIUM ALBUM COR SERPL-MCNC: 8.9 MG/DL (ref 8.5–10.5)
CALCIUM SERPL-MCNC: 9.2 MG/DL (ref 8.5–10.5)
CHLORIDE SERPL-SCNC: 102 MMOL/L (ref 96–112)
CHOLEST SERPL-MCNC: 207 MG/DL (ref 100–199)
CO2 SERPL-SCNC: 24 MMOL/L (ref 20–33)
CREAT SERPL-MCNC: 0.82 MG/DL (ref 0.5–1.4)
EST. AVERAGE GLUCOSE BLD GHB EST-MCNC: 111 MG/DL
GFR SERPLBLD CREATININE-BSD FMLA CKD-EPI: 72 ML/MIN/1.73 M 2
GLOBULIN SER CALC-MCNC: 2.3 G/DL (ref 1.9–3.5)
GLUCOSE SERPL-MCNC: 90 MG/DL (ref 65–99)
HBA1C MFR BLD: 5.5 % (ref 4–5.6)
HCV AB SER QL: NORMAL
HDLC SERPL-MCNC: 64 MG/DL
LDLC SERPL CALC-MCNC: 123 MG/DL
POTASSIUM SERPL-SCNC: 3.8 MMOL/L (ref 3.6–5.5)
PROT SERPL-MCNC: 6.7 G/DL (ref 6–8.2)
SODIUM SERPL-SCNC: 140 MMOL/L (ref 135–145)
TRIGL SERPL-MCNC: 98 MG/DL (ref 0–149)
TSH SERPL DL<=0.005 MIU/L-ACNC: 3.22 UIU/ML (ref 0.38–5.33)

## 2024-03-19 PROCEDURE — 36415 COLL VENOUS BLD VENIPUNCTURE: CPT

## 2024-03-19 PROCEDURE — 83036 HEMOGLOBIN GLYCOSYLATED A1C: CPT | Mod: GA

## 2024-03-19 PROCEDURE — 84443 ASSAY THYROID STIM HORMONE: CPT

## 2024-03-19 PROCEDURE — 80053 COMPREHEN METABOLIC PANEL: CPT

## 2024-03-19 PROCEDURE — G0472 HEP C SCREEN HIGH RISK/OTHER: HCPCS | Mod: GA

## 2024-03-19 PROCEDURE — 80061 LIPID PANEL: CPT

## 2024-04-17 ENCOUNTER — APPOINTMENT (OUTPATIENT)
Dept: MEDICAL GROUP | Facility: PHYSICIAN GROUP | Age: 79
End: 2024-04-17
Payer: MEDICARE

## 2024-05-07 ENCOUNTER — APPOINTMENT (OUTPATIENT)
Dept: MEDICAL GROUP | Facility: PHYSICIAN GROUP | Age: 79
End: 2024-05-07
Payer: MEDICARE

## 2024-05-07 VITALS
HEIGHT: 63 IN | OXYGEN SATURATION: 97 % | BODY MASS INDEX: 26.4 KG/M2 | RESPIRATION RATE: 16 BRPM | HEART RATE: 65 BPM | TEMPERATURE: 97.4 F | DIASTOLIC BLOOD PRESSURE: 62 MMHG | SYSTOLIC BLOOD PRESSURE: 108 MMHG | WEIGHT: 149 LBS

## 2024-05-07 DIAGNOSIS — E78.5 DYSLIPIDEMIA: ICD-10-CM

## 2024-05-07 DIAGNOSIS — Z00.00 MEDICARE ANNUAL WELLNESS VISIT, SUBSEQUENT: Primary | ICD-10-CM

## 2024-05-07 DIAGNOSIS — E03.9 HYPOTHYROIDISM, UNSPECIFIED TYPE: ICD-10-CM

## 2024-05-07 DIAGNOSIS — K52.9 CHRONIC DIARRHEA: ICD-10-CM

## 2024-05-07 PROCEDURE — G0439 PPPS, SUBSEQ VISIT: HCPCS

## 2024-05-07 RX ORDER — DIPHENOXYLATE HYDROCHLORIDE AND ATROPINE SULFATE 2.5; .025 MG/1; MG/1
1 TABLET ORAL 4 TIMES DAILY PRN
COMMUNITY

## 2024-05-07 RX ORDER — LORATADINE 10 MG/1
10 TABLET ORAL DAILY
COMMUNITY

## 2024-05-07 RX ORDER — VITAMIN B COMPLEX
1000 TABLET ORAL DAILY
COMMUNITY

## 2024-05-07 ASSESSMENT — ENCOUNTER SYMPTOMS: GENERAL WELL-BEING: GOOD

## 2024-05-07 ASSESSMENT — ACTIVITIES OF DAILY LIVING (ADL): BATHING_REQUIRES_ASSISTANCE: 0

## 2024-05-07 ASSESSMENT — PATIENT HEALTH QUESTIONNAIRE - PHQ9: CLINICAL INTERPRETATION OF PHQ2 SCORE: 0

## 2024-05-07 ASSESSMENT — FIBROSIS 4 INDEX: FIB4 SCORE: 1.48

## 2024-05-07 NOTE — PROGRESS NOTES
Chief Complaint   Patient presents with    Medicare Annual Wellness     Pt presents today feeling well for AWV.   HPI:  Poppy Puente is a 78 y.o. here for Medicare Annual Wellness Visit     Patient Active Problem List    Diagnosis Date Noted    10 year risk of MI or stroke 7.5% or greater 10/24/2023    Chronic bilateral low back pain without sciatica 10/24/2023    Severe sensorineural hearing loss 04/25/2023    Tension headache 04/25/2023    Osteopenia of hip 04/25/2023    Grief 10/25/2022    Vitamin D deficiency 10/05/2020    Visual disturbance 10/05/2020    Chronic diarrhea 03/15/2019    Dyslipidemia 07/18/2018    Hypothyroidism 07/18/2018    Benign essential tremor 07/18/2018       Current Outpatient Medications   Medication Sig Dispense Refill    vitamin D3 (CHOLECALCIFEROL) 1000 Unit (25 mcg) Tab Take 1,000 Units by mouth every day.      Calcium Carb-Cholecalciferol (CALCIUM 500 + D PO) Take  by mouth.      loratadine (CLARITIN) 10 MG Tab Take 10 mg by mouth every day.      diphenoxylate-atropine (LOMOTIL) 2.5-0.025 MG Tab Take 1 Tablet by mouth 4 times a day as needed for Diarrhea.      pravastatin (PRAVACHOL) 40 MG tablet Take 1 Tablet by mouth every day. 100 Tablet 3    levothyroxine (SYNTHROID) 50 MCG Tab Take 1 Tablet by mouth every morning. ON A EMPTY STOMACH 100 Tablet 3    Acetaminophen-Caffeine (EXCEDRIN TENSION HEADACHE PO) Take  by mouth.       No current facility-administered medications for this visit.          Current supplements as per medication list.     Allergies: Bactrim [sulfamethoxazole w-trimethoprim]    Current social contact/activities: Involved in book clubs, hangs out with other people. Lives independently, son close by. Has multiple cats.     She  reports that she has never smoked. She has never used smokeless tobacco. She reports current alcohol use. She reports that she does not use drugs.  Counseling given: Not Answered      ROS:    Gait: Uses no assistive  device  Ostomy: No  Other tubes: No  Amputations: No  Chronic oxygen use: No  Last eye exam: 2 years ago  Wears hearing aids: No   : Denies any urinary leakage during the last 6 months    Screening:  Depression Screening  Little interest or pleasure in doing things?  0 - not at all  Feeling down, depressed , or hopeless? 0 - not at all  Patient Health Questionnaire Score: 0     If depressive symptoms identified deferred to follow up visit unless specifically addressed in assessment and plan.    Interpretation of PHQ-9 Total Score   Score Severity   1-4 No Depression   5-9 Mild Depression   10-14 Moderate Depression   15-19 Moderately Severe Depression   20-27 Severe Depression    Screening for Cognitive Impairment  Do you or any of your friends or family members have any concern about your memory? No  Three Minute Recall (Leader, Season, Table) 2/3    Rocky clock face with all 12 numbers and set the hands to show 10 minutes after 11.  No    Cognitive concerns identified deferred for follow up unless specifically addressed in assessment and plan.    Pt denies concern, declines referral.    Fall Risk Assessment  Has the patient had two or more falls in the last year or any fall with injury in the last year?  No    Safety Assessment  Do you always wear your seatbelt?  Yes  Any changes to home needed to function safely? No  Difficulty hearing.  No  Patient counseled about all safety risks that were identified.    Functional Assessment ADLs  Are there any barriers preventing you from cooking for yourself or meeting nutritional needs?  No.    Are there any barriers preventing you from driving safely or obtaining transportation?  No.    Are there any barriers preventing you from using a telephone or calling for help?  No    Are there any barriers preventing you from shopping?  No.    Are there any barriers preventing you from taking care of your own finances?  No    Are there any barriers preventing you from managing your  medications?  No    Are there any barriers preventing you from showering, bathing or dressing yourself? No    Are there any barriers preventing you from doing housework or laundry? No  Are there any barriers preventing you from using the toilet?No  Are you currently engaging in any exercise or physical activity?  Yes.      Self-Assessment of Health  What is your perception of your health? Good  Do you sleep more than six hours a night? Yes  In the past 7 days, how much did pain keep you from doing your normal work? None  Do you spend quality time with family or friends (virtually or in person)? Yes  Do you usually eat a heart healthy diet that constists of a variety of fruits, vegetables, whole grains and fiber? Yes  Do you eat foods high in fat and/or Fast Food more than three times per week? No    Advance Care Planning  Do you have an Advance Directive, Living Will, Durable Power of , or POLST? Yes    Living Will     is not on file - instructed patient to bring in a copy to scan into their chart  PROVIDED FOR PT; Polst filled out in clinic/scanned into chart      Health Maintenance Summary            Postponed - Zoster (Shingles) Vaccines (1 of 2) Postponed until 10/7/2024      No completion history exists for this topic.              Postponed - COVID-19 Vaccine (3 - 2023-24 season) Postponed until 5/7/2025 11/25/2022  Imm Admin: MODERNA BIVALENT BOOSTER SARS-COV-2 VACCINE (6+)    02/14/2021  Imm Admin: MODERNA SARS-COV-2 VACCINE (12+)    01/17/2021  Imm Admin: MODERNA SARS-COV-2 VACCINE (12+)              Influenza Vaccine (Season Ended) Next due on 9/1/2024      10/25/2022  Imm Admin: Influenza Vaccine Adult HD    10/11/2021  Imm Admin: Influenza Vaccine Adult HD    10/05/2020  Imm Admin: Influenza Vaccine Adult HD              Annual Wellness Visit (Yearly) Next due on 5/7/2025 05/07/2024  Visit Dx: Medicare annual wellness visit, subsequent    04/25/2023  Done    04/25/2023  Level of  Service: [place] ANNUAL WELLNESS VISIT-INCLUDES PPPS SUBSEQUE*    10/05/2020  Level of Service: IA ANNUAL WELLNESS VISIT-INCLUDES PPPS SUBSEQUE*    10/05/2020  Visit Dx: Medicare annual wellness visit, subsequent    Only the first 5 history entries have been loaded, but more history exists.              Bone Density Scan (Every 5 Years) Next due on 2023  DS-BONE DENSITY STUDY (DEXA)    11/10/2015  DS-BONE DENSITY STUDY (DEXA)              IMM DTaP/Tdap/Td Vaccine (2 - Td or Tdap) Next due on 2019  Imm Admin: Tdap Vaccine              Pneumococcal Vaccine: 65+ Years (Series Information) Completed      10/05/2020  Imm Admin: Pneumococcal polysaccharide vaccine (PPSV-23)    2019  Imm Admin: Pneumococcal Conjugate Vaccine (Prevnar/PCV-13)              Hepatitis C Screening  Completed      2024  Hepatitis C Antibody component of HCV Scrn ( 3476-2830 1xLife - Medicare Patients Only)              Hepatitis A Vaccine (Hep A) (Series Information) Aged Out      No completion history exists for this topic.              HPV Vaccines (Series Information) Aged Out      No completion history exists for this topic.              Polio Vaccine (Inactivated Polio) (Series Information) Aged Out      No completion history exists for this topic.              Meningococcal Immunization (Series Information) Aged Out      No completion history exists for this topic.              Discontinued - Mammogram  Discontinued        Frequency changed to Never automatically (Topic No Longer Applies)    2018  MA-MAMMO SCREENING BILAT W/ELIAS W/CAD    11/10/2015  [id number]              Discontinued - Colorectal Cancer Screening  Discontinued        Frequency changed to Never automatically (Topic No Longer Applies)    2020  REFERRAL TO GI FOR COLONOSCOPY    10/09/2019  COLOGUARD COLON CANCER SCREENING    2019  OCCULT BLOOD FECES IMMUNOASSAY (FIT)    2018  OCCULT  "BLOOD FECES IMMUNOASSAY (FIT)    Only the first 5 history entries have been loaded, but more history exists.              Discontinued - Hepatitis B Vaccine (Hep B)  Discontinued      No completion history exists for this topic.                    Patient Care Team:  MARKELL Sethi as PCP - General (Nurse Practitioner Family)  Janel Carlson O.D. as Consulting Physician (Optometry)  Isabella Brannon MD  as Consulting Physician (Family Medicine)  Sola Salinas (Audiologist)        Social History     Tobacco Use    Smoking status: Never    Smokeless tobacco: Never   Vaping Use    Vaping Use: Never used   Substance Use Topics    Alcohol use: Yes     Comment: occ     Drug use: No     Family History   Problem Relation Age of Onset    No Known Problems Mother     No Known Problems Father     Arthritis Sister     No Known Problems Maternal Grandmother     No Known Problems Maternal Grandfather     No Known Problems Sister      She  has no past medical history on file.   History reviewed. No pertinent surgical history.    Exam:   /62 (BP Location: Left arm, Patient Position: Sitting)   Pulse 65   Temp 36.3 °C (97.4 °F) (Temporal)   Resp 16   Ht 1.6 m (5' 3\")   Wt 67.6 kg (149 lb)   SpO2 97%  Body mass index is 26.39 kg/m².    Hearing satisfactory.    Dentition fair  Alert, oriented in no acute distress.  Eye contact is good, speech goal directed, affect calm    Assessment and Plan. The following treatment and monitoring plan is recommended:  Dictation #1  MRN:[id number]  CSN:[id number]   Problem List Items Addressed This Visit       Dyslipidemia     Chronic, stable. Continue healthy lifestyle recommendations, continue pravastatin 40 mg daily         Hypothyroidism     Chronic, stable. Continue levothyroxine 50 mcg daily         Chronic diarrhea     Chronic, stable. Reports fewer diarrhea episodes. Using lomotil as needed with benefit and reduced diarrhea. Reports lomotil is less " constipating than imodium. Continue this, does not require refill on this today.          Other Visit Diagnoses       Medicare annual wellness visit, subsequent    -  Primary          Services suggested: No services needed at this time  Health Care Screening: Age-appropriate preventive services recommended by USPTF and ACIP covered by Medicare were discussed today. Services ordered if indicated and agreed upon by the patient.  Referrals offered: Community-based lifestyle interventions to reduce health risks and promote self-management and wellness, fall prevention, nutrition, physical activity, tobacco-use cessation, weight loss, and mental health services as per orders if indicated.    Discussion today about general wellness and lifestyle habits:    Prevent falls and reduce trip hazards; Cautioned about securing or removing rugs.  Have a working fire alarm and carbon monoxide detector;   Engage in regular physical activity and social activities     Follow-up: No follow-ups on file.

## 2024-05-07 NOTE — ASSESSMENT & PLAN NOTE
Chronic, stable. Reports fewer diarrhea episodes. Using lomotil as needed with benefit and reduced diarrhea. Reports lomotil is less constipating than imodium. Continue this, does not require refill on this today.

## 2024-11-07 ENCOUNTER — OFFICE VISIT (OUTPATIENT)
Dept: MEDICAL GROUP | Facility: PHYSICIAN GROUP | Age: 79
End: 2024-11-07
Payer: MEDICARE

## 2024-11-07 VITALS
OXYGEN SATURATION: 98 % | HEART RATE: 67 BPM | TEMPERATURE: 97 F | WEIGHT: 133 LBS | BODY MASS INDEX: 22.16 KG/M2 | HEIGHT: 65 IN | DIASTOLIC BLOOD PRESSURE: 80 MMHG | SYSTOLIC BLOOD PRESSURE: 112 MMHG

## 2024-11-07 DIAGNOSIS — G44.209 TENSION HEADACHE: ICD-10-CM

## 2024-11-07 DIAGNOSIS — E03.9 HYPOTHYROIDISM, UNSPECIFIED TYPE: ICD-10-CM

## 2024-11-07 DIAGNOSIS — F43.21 GRIEF: ICD-10-CM

## 2024-11-07 DIAGNOSIS — Z13.29 THYROID DISORDER SCREEN: ICD-10-CM

## 2024-11-07 DIAGNOSIS — Z13.0 SCREENING FOR DEFICIENCY ANEMIA: ICD-10-CM

## 2024-11-07 DIAGNOSIS — K52.9 CHRONIC DIARRHEA: ICD-10-CM

## 2024-11-07 DIAGNOSIS — Z13.6 SCREENING FOR CARDIOVASCULAR CONDITION: ICD-10-CM

## 2024-11-07 DIAGNOSIS — E55.9 VITAMIN D DEFICIENCY: ICD-10-CM

## 2024-11-07 PROBLEM — H53.9 VISUAL DISTURBANCE: Status: RESOLVED | Noted: 2020-10-05 | Resolved: 2024-11-07

## 2024-11-07 PROCEDURE — 3074F SYST BP LT 130 MM HG: CPT

## 2024-11-07 PROCEDURE — 99214 OFFICE O/P EST MOD 30 MIN: CPT

## 2024-11-07 PROCEDURE — 3079F DIAST BP 80-89 MM HG: CPT

## 2024-11-07 RX ORDER — DIPHENOXYLATE HYDROCHLORIDE AND ATROPINE SULFATE 2.5; .025 MG/1; MG/1
1 TABLET ORAL 4 TIMES DAILY PRN
Qty: 90 TABLET | Refills: 0 | Status: SHIPPED | OUTPATIENT
Start: 2024-11-07 | End: 2024-12-07

## 2024-11-07 ASSESSMENT — FIBROSIS 4 INDEX: FIB4 SCORE: 1.5

## 2024-11-07 NOTE — ASSESSMENT & PLAN NOTE
Chronic, ongoing. Reports feeling lonely. Remains busy with clubs, discussed treatment options for her. Suggested trial of [facility].

## 2024-11-07 NOTE — ASSESSMENT & PLAN NOTE
Chronic, stable. Continue lomotil as needed. Last rx of 90 tabs has lasted for > 1 year.     Orders:    diphenoxylate-atropine (LOMOTIL) 2.5-0.025 MG Tab; Take 1 Tablet by mouth 4 times a day as needed for Diarrhea for up to 30 days. Indications: Diarrhea    CBC WITHOUT DIFFERENTIAL; Future

## 2024-11-07 NOTE — ASSESSMENT & PLAN NOTE
Chronic, ongoing. Using Excedrin tension headache as needed with sufficient relief of symptoms.

## 2024-11-07 NOTE — PROGRESS NOTES
"Subjective:     CC: Diagnoses of Grief, Hypothyroidism, unspecified type, Chronic diarrhea, Tension headache, Screening for cardiovascular condition, Thyroid disorder screen, Vitamin D deficiency, and Screening for deficiency anemia were pertinent to this visit.    Pt presents today for follow up. She describes feeling lonely recently. Wishing she lived closer to town, not loving living by her self.      HPI:   Poppy presents today with    Problem   Tension Headache   Grief   Chronic Diarrhea    Uds/cs: 10/24/23     Hypothyroidism   Visual Disturbance (Resolved)       ROS:  Review of Systems   All other systems reviewed and are negative.      Objective:     Exam:  /80 (BP Location: Left arm, Patient Position: Sitting, BP Cuff Size: Adult)   Pulse 67   Temp 36.1 °C (97 °F) (Temporal)   Ht 1.651 m (5' 5\")   Wt 60.3 kg (133 lb)   SpO2 98%   BMI 22.13 kg/m²  Body mass index is 22.13 kg/m².    Physical Exam  Vitals reviewed.   Constitutional:       General: She is not in acute distress.     Appearance: Normal appearance. She is not ill-appearing.   HENT:      Head: Normocephalic and atraumatic.   Cardiovascular:      Rate and Rhythm: Normal rate.      Pulses: Normal pulses.   Pulmonary:      Effort: Pulmonary effort is normal. No respiratory distress.   Skin:     General: Skin is warm and dry.      Findings: No rash.   Neurological:      General: No focal deficit present.      Mental Status: She is alert and oriented to person, place, and time.   Psychiatric:         Mood and Affect: Mood normal.         Behavior: Behavior normal.       Assessment & Plan:     79 y.o. female with the following -     Assessment & Plan  Grief  Chronic, ongoing. Reports feeling lonely. Remains busy with clubs, discussed treatment options for her. Suggested trial of [facility].          Hypothyroidism, unspecified type  Chronic, stable. Continue levothyroxine 50 mcg daily on an empty stomach.    Orders:    TSH; " Future    Chronic diarrhea  Chronic, stable. Continue lomotil as needed. Last rx of 90 tabs has lasted for > 1 year.     Orders:    diphenoxylate-atropine (LOMOTIL) 2.5-0.025 MG Tab; Take 1 Tablet by mouth 4 times a day as needed for Diarrhea for up to 30 days. Indications: Diarrhea    CBC WITHOUT DIFFERENTIAL; Future    Tension headache  Chronic, ongoing. Using Excedrin tension headache as needed with sufficient relief of symptoms.         Screening for cardiovascular condition    Orders:    Comp Metabolic Panel; Future    Lipid Profile; Future    Thyroid disorder screen    Orders:    TSH; Future    Vitamin D deficiency    Orders:    VITAMIN D,25 HYDROXY (DEFICIENCY); Future    Screening for deficiency anemia    Orders:    CBC WITHOUT DIFFERENTIAL; Future       Patient was educated in proper administration of medication(s) ordered today including safety, possible SE, risks, benefits, rationale and alternatives to therapy.   Supportive care, differential diagnoses, and indications for immediate follow-up discussed with patient.    Pathogenesis of diagnosis discussed including typical length and natural progression.    Instructed to return to clinic or nearest emergency department for any change in condition, further concerns, or worsening of symptoms.  Patient states understanding of the plan of care and discharge instructions.    Return in about 6 months (around 5/7/2025).    Please note that this dictation was created using voice recognition software. I have made every reasonable attempt to correct obvious errors, but I expect that there are errors of grammar and possibly content that I did not discover before finalizing the note.

## 2024-11-07 NOTE — ASSESSMENT & PLAN NOTE
Chronic, stable. Continue levothyroxine 50 mcg daily on an empty stomach.    Orders:    TSH; Future

## 2025-01-15 DIAGNOSIS — E03.9 HYPOTHYROIDISM, UNSPECIFIED TYPE: ICD-10-CM

## 2025-01-16 RX ORDER — LEVOTHYROXINE SODIUM 50 UG/1
50 TABLET ORAL EVERY MORNING
Qty: 100 TABLET | Refills: 3 | Status: SHIPPED | OUTPATIENT
Start: 2025-01-16

## 2025-01-16 NOTE — TELEPHONE ENCOUNTER
Received request via: Patient    Was the patient seen in the last year in this department? Yes    Does the patient have an active prescription (recently filled or refills available) for medication(s) requested?  NO    Pharmacy Name: [id number] - [place], [place] - [address] AT [facility]     Does the patient have correction Plus and need 100-day supply? (This applies to ALL medications) Patient does not have SCP

## 2025-01-19 DIAGNOSIS — E78.5 DYSLIPIDEMIA: ICD-10-CM

## 2025-01-20 NOTE — TELEPHONE ENCOUNTER
Received request via: Patient    Was the patient seen in the last year in this department? Yes    Does the patient have an active prescription (recently filled or refills available) for medication(s) requested? No    Pharmacy Name: [facility]    Does the patient have correction Plus and need 100-day supply? (This applies to ALL medications) Patient does not have SCP

## 2025-01-21 RX ORDER — PRAVASTATIN SODIUM 40 MG
40 TABLET ORAL DAILY
Qty: 100 TABLET | Refills: 3 | Status: SHIPPED | OUTPATIENT
Start: 2025-01-21

## 2025-06-17 ENCOUNTER — HOSPITAL ENCOUNTER (OUTPATIENT)
Dept: LAB | Facility: MEDICAL CENTER | Age: 80
End: 2025-06-17
Payer: MEDICARE

## 2025-06-17 DIAGNOSIS — E03.9 HYPOTHYROIDISM, UNSPECIFIED TYPE: ICD-10-CM

## 2025-06-17 DIAGNOSIS — E55.9 VITAMIN D DEFICIENCY: ICD-10-CM

## 2025-06-17 DIAGNOSIS — Z13.29 THYROID DISORDER SCREEN: ICD-10-CM

## 2025-06-17 DIAGNOSIS — Z13.6 SCREENING FOR CARDIOVASCULAR CONDITION: ICD-10-CM

## 2025-06-17 LAB
25(OH)D3 SERPL-MCNC: 73 NG/ML (ref 30–100)
ALBUMIN SERPL BCP-MCNC: 4.2 G/DL (ref 3.2–4.9)
ALBUMIN/GLOB SERPL: 1.7 G/DL
ALP SERPL-CCNC: 78 U/L (ref 30–99)
ALT SERPL-CCNC: 17 U/L (ref 2–50)
ANION GAP SERPL CALC-SCNC: 12 MMOL/L (ref 7–16)
AST SERPL-CCNC: 24 U/L (ref 12–45)
BILIRUB SERPL-MCNC: 0.6 MG/DL (ref 0.1–1.5)
BUN SERPL-MCNC: 11 MG/DL (ref 8–22)
CALCIUM ALBUM COR SERPL-MCNC: 9 MG/DL (ref 8.5–10.5)
CALCIUM SERPL-MCNC: 9.2 MG/DL (ref 8.5–10.5)
CHLORIDE SERPL-SCNC: 102 MMOL/L (ref 96–112)
CHOLEST SERPL-MCNC: 265 MG/DL (ref 100–199)
CO2 SERPL-SCNC: 25 MMOL/L (ref 20–33)
CREAT SERPL-MCNC: 0.89 MG/DL (ref 0.5–1.4)
GFR SERPLBLD CREATININE-BSD FMLA CKD-EPI: 65 ML/MIN/1.73 M 2
GLOBULIN SER CALC-MCNC: 2.5 G/DL (ref 1.9–3.5)
GLUCOSE SERPL-MCNC: 91 MG/DL (ref 65–99)
HDLC SERPL-MCNC: 65 MG/DL
LDLC SERPL CALC-MCNC: 175 MG/DL
POTASSIUM SERPL-SCNC: 4.1 MMOL/L (ref 3.6–5.5)
PROT SERPL-MCNC: 6.7 G/DL (ref 6–8.2)
SODIUM SERPL-SCNC: 139 MMOL/L (ref 135–145)
TRIGL SERPL-MCNC: 124 MG/DL (ref 0–149)
TSH SERPL-ACNC: 2.65 UIU/ML (ref 0.38–5.33)

## 2025-06-17 PROCEDURE — 82306 VITAMIN D 25 HYDROXY: CPT

## 2025-06-17 PROCEDURE — 84443 ASSAY THYROID STIM HORMONE: CPT

## 2025-06-17 PROCEDURE — 36415 COLL VENOUS BLD VENIPUNCTURE: CPT

## 2025-06-17 PROCEDURE — 80053 COMPREHEN METABOLIC PANEL: CPT

## 2025-06-17 PROCEDURE — 80061 LIPID PANEL: CPT

## 2025-06-18 ENCOUNTER — RESULTS FOLLOW-UP (OUTPATIENT)
Dept: MEDICAL GROUP | Facility: PHYSICIAN GROUP | Age: 80
End: 2025-06-18

## 2025-06-24 ENCOUNTER — APPOINTMENT (OUTPATIENT)
Dept: MEDICAL GROUP | Facility: PHYSICIAN GROUP | Age: 80
End: 2025-06-24
Payer: MEDICARE

## 2025-06-24 VITALS
HEART RATE: 87 BPM | SYSTOLIC BLOOD PRESSURE: 102 MMHG | BODY MASS INDEX: 21.66 KG/M2 | RESPIRATION RATE: 16 BRPM | OXYGEN SATURATION: 97 % | TEMPERATURE: 97.9 F | HEIGHT: 65 IN | DIASTOLIC BLOOD PRESSURE: 62 MMHG | WEIGHT: 130 LBS

## 2025-06-24 DIAGNOSIS — E78.5 DYSLIPIDEMIA: ICD-10-CM

## 2025-06-24 DIAGNOSIS — G25.0 BENIGN ESSENTIAL TREMOR: ICD-10-CM

## 2025-06-24 DIAGNOSIS — Z00.00 MEDICARE ANNUAL WELLNESS VISIT, SUBSEQUENT: Primary | ICD-10-CM

## 2025-06-24 DIAGNOSIS — Z53.20 STATIN DECLINED: ICD-10-CM

## 2025-06-24 DIAGNOSIS — E03.9 HYPOTHYROIDISM, UNSPECIFIED TYPE: ICD-10-CM

## 2025-06-24 DIAGNOSIS — M85.852 OSTEOPENIA OF LEFT HIP: ICD-10-CM

## 2025-06-24 ASSESSMENT — ACTIVITIES OF DAILY LIVING (ADL): BATHING_REQUIRES_ASSISTANCE: 0

## 2025-06-24 ASSESSMENT — PATIENT HEALTH QUESTIONNAIRE - PHQ9: CLINICAL INTERPRETATION OF PHQ2 SCORE: 0

## 2025-06-24 ASSESSMENT — ENCOUNTER SYMPTOMS: GENERAL WELL-BEING: EXCELLENT

## 2025-06-24 NOTE — PATIENT INSTRUCTIONS
"Cholesterol-lowering supplements may be helpful  Diet and exercise are proven ways to reduce cholesterol. Cholesterol-lowering supplements may help, too.  By [facility] Staff  If you're worried about your cholesterol level and have started exercising and eating healthier foods, you might wonder if a dietary supplement could help. With your doctor's OK, here are some cholesterol-improving supplements to consider.  Cholesterol-improving supplement What it might do Side effects and drug interactions   Berberine May reduce low-density lipoprotein (LDL, or \"bad\") cholesterol and triglycerides May cause diarrhea, constipation, gas, nausea or vomiting; may cause harm to babies during pregnancy and breastfeeding   Fish oil May reduce triglycerides May cause a fishy aftertaste, bad breath, gas, nausea, vomiting or diarrhea; may interact with some blood-thinning medications   Flaxseed, ground May reduce LDL cholesterol May cause gas, bloating or diarrhea; may interact with some blood-thinning medications   Garlic May slightly reduce cholesterol but studies have been conflicting May cause bad breath, body odor, nausea, vomiting and gas; may interact with some blood-thinning medications   Green tea or green tea extract May lower LDL cholesterol May cause nausea, vomiting, gas or diarrhea; may interact with blood-thinning medications   Niacin May lower LDL cholesterol and triglycerides; may improve high-density lipoprotein (HDL, or \"good\") cholesterol May cause itching and flushing, which are more common at the higher doses usually needed to have an effect on cholesterol   Plant stanols and sterols May reduce LDL cholesterol, particularly in people with a genetic condition that causes high cholesterol (familial hypercholesterolemia) May cause diarrhea   Red yeast rice -- Natural doesn't mean safe  Some red yeast rice products contain a substance (monacolin K) that is chemically identical to the active ingredient in lovastatin " (Altoprev), a prescription medication that lowers cholesterol. Because there is variability in quality from , the amount of monacolin K can vary widely from product to product.  Products that contain monacolin K can cause the same types of side effects as lovastatin, which include damage to the muscles, kidneys and liver. In the [place], the Food and Drug Administration has ruled that dietary supplements that contain more than trace amounts of monacolin K are unapproved drugs and can't be sold legally as dietary supplements.  Dietary supplements may not be enough  While dietary supplements can help, you might also need prescription medications to get your cholesterol numbers to a safe level. Be sure to tell your doctor if you take any type of dietary supplement, because some can interact with medications you may be taking.

## 2025-06-24 NOTE — ASSESSMENT & PLAN NOTE
Chronic, ongoing. Has stopped taking cholesterol lowering medication due to side effects- reports she does not wish to take this.

## 2025-06-24 NOTE — PROGRESS NOTES
Chief Complaint   Patient presents with    Medicare Annual Wellness    Paperwork     Re-new license        HPI:  Poppy Puente is a 79 y.o. here for Medicare Annual Wellness Visit     History of Present Illness  Patient presents for annual wellness visit.    Tremors  - Experiencing tremors, suspected hereditary as son and grandson have similar symptoms.  - Severity varies, sometimes absent.  - Concerned about possible Parkinson's disease.    Imbalance  - Reports occasional imbalance, not related to ear issues.  - Maintains hydration.    Medication and Supplements  - Discontinued pravastatin due to muscle aches and cramps.  - Currently on levothyroxine, calcium, vitamin D, Claritin as needed, and recently started vitamin B supplements.    Physical Activity  - Reports limited physical activity but makes an effort.    Other Health Information  - Normal bowel and bladder function, no recent falls.    Supplemental information: Saw eye doctor three weeks ago, completed DMV documentation.    FAMILY HISTORY  Sister had shingles a few years ago.      Patient Active Problem List    Diagnosis Date Noted    Statin declined 06/24/2025    10 year risk of MI or stroke 7.5% or greater 10/24/2023    Chronic bilateral low back pain without sciatica 10/24/2023    Severe sensorineural hearing loss 04/25/2023    Tension headache 04/25/2023    Osteopenia of hip 04/25/2023    Grief 10/25/2022    Vitamin D deficiency 10/05/2020    Chronic diarrhea 03/15/2019    Dyslipidemia 07/18/2018    Hypothyroidism 07/18/2018    Benign essential tremor 07/18/2018       Current Medications[1]       Current supplements as per medication list.     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    She  reports that she has never smoked. She has never used smokeless tobacco. She reports current alcohol use. She reports that she does not use  drugs.  Counseling given: Not Answered      ROS:    Gait: Uses no assistive device  Ostomy: No  Other tubes: No  Amputations: No  Chronic oxygen use: No  Last eye exam: 1 month ago  Wears hearing aids: No   : Denies any urinary leakage during the last 6 months    Screening:    Depression Screening  Little interest or pleasure in doing things?  0 - not at all  Feeling down, depressed , or hopeless? 0 - not at all  Patient Health Questionnaire Score: 0     If depressive symptoms identified deferred to follow up visit unless specifically addressed in assessment and plan.    Interpretation of PHQ-9 Total Score   Score Severity   1-4 No Depression   5-9 Mild Depression   10-14 Moderate Depression   15-19 Moderately Severe Depression   20-27 Severe Depression    Screening for Cognitive Impairment  Do you or any of your friends or family members have any concern about your memory? No  Three Minute Recall (Village, Kitchen, Baby) 3/3    Rocky clock face with all 12 numbers and set the hands to show 10 minutes past 11.  Yes    Cognitive concerns identified deferred for follow up unless specifically addressed in assessment and plan.    Fall Risk Assessment  Has the patient had two or more falls in the last year or any fall with injury in the last year?  No    Safety Assessment  Do you always wear your seatbelt?  Yes  Any changes to home needed to function safely? No  Difficulty hearing.  No  Patient counseled about all safety risks that were identified.    Functional Assessment ADLs  Are there any barriers preventing you from cooking for yourself or meeting nutritional needs?  No.    Are there any barriers preventing you from driving safely or obtaining transportation?  No.    Are there any barriers preventing you from using a telephone or calling for help?  No    Are there any barriers preventing you from shopping?  No.    Are there any barriers preventing you from taking care of your own finances?  No    Are there any  barriers preventing you from managing your medications?  No    Are there any barriers preventing you from showering, bathing or dressing yourself? No    Are there any barriers preventing you from doing housework or laundry? No  Are there any barriers preventing you from using the toilet?No  Are you currently engaging in any exercise or physical activity?  No.      Self-Assessment of Health  What is your perception of your health? Excellent    Do you sleep more than six hours a night? Yes    In the past 7 days, how much did pain keep you from doing your normal work? None    Do you spend quality time with family or friends (virtually or in person)? Yes    Do you usually eat a heart healthy diet that constists of a variety of fruits, vegetables, whole grains and fiber? Yes    Do you eat foods high in fat and/or Fast Food more than three times per week? No    How concerned are you that your medical conditions are not being well managed? Not at all    Are you worried that in the next 2 months, you may not have stable housing that you own, rent, or stay in as part of a household? No      Advance Care Planning  Do you have an Advance Directive, Living Will, Durable Power of , or POLST? Yes    Living Will Durable Power of    is not on file - instructed patient to bring in a copy to scan into their chart      Health Maintenance Summary            Upcoming       Influenza Vaccine (Season Ended) Postponed until 11/7/2025      10/25/2022  Imm Admin: Influenza Vaccine Adult     10/11/2021  Imm Admin: Influenza Vaccine Adult     10/05/2020  Imm Admin: Influenza Vaccine Adult               COVID-19 Vaccine (3 - 2024-25 season) Postponed until 11/7/2025 11/25/2022  Imm Admin: MODERNA BIVALENT BOOSTER SARS-COV-2 VACCINE (6+)    02/14/2021  Imm Admin: MODERNA SARS-COV-2 VACCINE (12+)    01/17/2021  Imm Admin: MODERNA SARS-COV-2 VACCINE (12+)              Zoster (Shingles) Vaccines (1 of 2) Postponed until  11/24/2025     No completion history exists for this topic.              Annual Wellness Visit (Yearly) Next due on 6/24/2026 06/24/2025  Level of Service: ND ANNUAL WELLNESS VISIT-INCLUDES PPPS SUBSEQUE*    06/24/2025  Visit Dx: Medicare annual wellness visit, subsequent    05/07/2024  Level of Service: ND ANNUAL WELLNESS VISIT-INCLUDES PPPS SUBSEQUE*    05/07/2024  Visit Dx: Medicare annual wellness visit, subsequent    04/25/2023  Done     Only the first 5 history entries have been loaded, but more history exists.            Bone Density Scan (Every 5 Years) Next due on 4/20/2028 04/20/2023  DS-BONE DENSITY STUDY (DEXA)    11/10/2015  DS-BONE DENSITY STUDY (DEXA)              IMM DTaP/Tdap/Td Vaccine (2 - Td or Tdap) Next due on 9/24/2029 09/24/2019  Imm Admin: Tdap Vaccine                      Completed or No Longer Recommended       Pneumococcal Vaccine: 50+ Years (Series Information) Completed      10/05/2020  Imm Admin: Pneumococcal polysaccharide vaccine (PPSV-23)    09/24/2019  Imm Admin: Pneumococcal Conjugate Vaccine (Prevnar/PCV-13)              Hepatitis A Vaccine (Hep A) (Series Information) Aged Out      No completion history exists for this topic.              HPV Vaccines (Series Information) Aged Out     No completion history exists for this topic.              Polio Vaccine (Inactivated Polio) (Series Information) Aged Out     No completion history exists for this topic.              Meningococcal Immunization (Series Information) Aged Out     No completion history exists for this topic.              Meningococcal B Vaccine (Series Information) Aged Out     No completion history exists for this topic.              Mammogram  Discontinued        Frequency changed to Never automatically (Topic No Longer Applies)    08/09/2018  MA-MAMMO SCREENING BILAT W/ELIAS W/CAD    11/10/2015  [id number]              Colorectal Cancer Screening  Discontinued        Frequency changed to  "Never automatically (Topic No Longer Applies)    2020  REFERRAL TO GI FOR COLONOSCOPY    10/09/2019  COLOGUARD COLON CANCER SCREENING    2019  OCCULT BLOOD FECES IMMUNOASSAY (FIT)    2018  OCCULT BLOOD FECES IMMUNOASSAY (FIT)     Only the first 5 history entries have been loaded, but more history exists.            Hepatitis B Vaccine (Hep B)  Discontinued      No completion history exists for this topic.              Hepatitis C Screening  Discontinued        Frequency changed to Never automatically (Topic No Longer Applies)    2024  Hepatitis C Antibody component of HCV Scrn ( [id number] [employer] - Medicare Patients Only)                            Patient Care Team:  MARKELL Sethi as PCP - General (Nurse Practitioner Family)  Janel Carlson O.D. as Consulting Physician (Optometry)  Isabella Brannon MD  as Consulting Physician (Family Medicine)  Sola Salinas (Audiologist)        Social History[2]  Family History   Problem Relation Age of Onset    No Known Problems Mother     No Known Problems Father     Arthritis Sister     No Known Problems Maternal Grandmother     No Known Problems Maternal Grandfather     No Known Problems Sister      She  has a past medical history of Visual disturbance (10/05/2020).   Past Surgical History[3]    Exam:   /62 (BP Location: Right arm, Patient Position: Sitting)   Pulse 87   Temp 36.6 °C (97.9 °F) (Temporal)   Resp 16   Ht 1.651 m (5' 5\")   Wt 59 kg (130 lb)   SpO2 97%  Body mass index is 21.63 kg/m².    Hearing good.    Dentition good  Alert, oriented in no acute distress.  Eye contact is good, speech goal directed, affect calm      Labs:   Latest Reference Range & Units 25 08:51   Sodium 135 - 145 mmol/L 139   Potassium 3.6 - 5.5 mmol/L 4.1   Chloride 96 - 112 mmol/L 102   Co2 20 - 33 mmol/L 25   Anion Gap 7.0 - 16.0  12.0   Glucose 65 - 99 mg/dL 91   Bun 8 - 22 mg/dL 11   Creatinine 0.50 - 1.40 mg/dL 0.89 "   GFR (CKD-EPI) >60 mL/min/1.73 m 2 65   Calcium 8.5 - 10.5 mg/dL 9.2   Correct Calcium 8.5 - 10.5 mg/dL 9.0   AST(SGOT) 12 - 45 U/L 24   ALT(SGPT) 2 - 50 U/L 17   Alkaline Phosphatase 30 - 99 U/L 78   Total Bilirubin 0.1 - 1.5 mg/dL 0.6   Albumin 3.2 - 4.9 g/dL 4.2   Total Protein 6.0 - 8.2 g/dL 6.7   Globulin 1.9 - 3.5 g/dL 2.5   A-G Ratio g/dL 1.7   Cholesterol,Tot 100 - 199 mg/dL 265 (H)   Triglycerides 0 - 149 mg/dL 124   HDL >=40 mg/dL 65   LDL <100 mg/dL 175 (H)   25-Hydroxy   Vitamin D 25 30 - 100 ng/mL 73   TSH 0.380 - 5.330 uIU/mL 2.650   (H): Data is abnormally high      Assessment and Plan. The following treatment and monitoring plan is recommended:    Problem List Items Addressed This Visit       Dyslipidemia    Chronic, ongoing. Has stopped taking cholesterol lowering medication due to side effects- reports she does not wish to take this.            Hypothyroidism    Chronic, stable. Continue levothyroxine 50 mcg daily.         Benign essential tremor    Chronic, stable. Denies change in tremor.            Osteopenia of hip    Continue vit D3 with calcium, continue healthy lifestyle recommendations.         Statin declined    Pt declines statin, reports tried pravastatin, did not like the way it made her feel. Has stopped this.          Other Visit Diagnoses         Medicare annual wellness visit, subsequent    -  Primary            Services suggested: No services needed at this time  Health Care Screening: Age-appropriate preventive services recommended by USPTF and ACIP covered by Medicare were discussed today. Services ordered if indicated and agreed upon by the patient.  Referrals offered: Community-based lifestyle interventions to reduce health risks and promote self-management and wellness, fall prevention, nutrition, physical activity, tobacco-use cessation, weight loss, and mental health services as per orders if indicated.    Discussion today about general wellness and lifestyle habits:     Prevent falls and reduce trip hazards; Cautioned about securing or removing rugs.  Have a working fire alarm and carbon monoxide detector;   Engage in regular physical activity and social activities     Follow-up: Return in about 6 months (around 12/24/2025), or if symptoms worsen or fail to improve.         [1]   Current Outpatient Medications   Medication Sig Dispense Refill    B Complex-Folic Acid (B COMPLEX PLUS PO) Take  by mouth.      levothyroxine (SYNTHROID) 50 MCG Tab Take 1 Tablet by mouth every morning. 1/16/25 Complete labs for future refill 100 Tablet 3    vitamin D3 (CHOLECALCIFEROL) 1000 Unit (25 mcg) Tab Take 1,000 Units by mouth every day.      Calcium Carb-Cholecalciferol (CALCIUM 500 + D PO) Take  by mouth.      loratadine (CLARITIN) 10 MG Tab Take 10 mg by mouth every day.      Acetaminophen-Caffeine (EXCEDRIN TENSION HEADACHE PO) Take  by mouth.       No current facility-administered medications for this visit.   [2]   Social History  Tobacco Use    Smoking status: Never    Smokeless tobacco: Never   Vaping Use    Vaping status: Never Used   Substance Use Topics    Alcohol use: Yes     Comment: occ     Drug use: No   [3] History reviewed. No pertinent surgical history.

## 2025-06-24 NOTE — ASSESSMENT & PLAN NOTE
Pt declines statin, reports tried pravastatin, did not like the way it made her feel. Has stopped this.